# Patient Record
Sex: MALE | Race: OTHER | HISPANIC OR LATINO | URBAN - METROPOLITAN AREA
[De-identification: names, ages, dates, MRNs, and addresses within clinical notes are randomized per-mention and may not be internally consistent; named-entity substitution may affect disease eponyms.]

---

## 2020-04-19 ENCOUNTER — INPATIENT (INPATIENT)
Facility: HOSPITAL | Age: 55
LOS: 3 days | Discharge: HOME | DRG: 177 | End: 2020-04-23
Attending: INTERNAL MEDICINE | Admitting: INTERNAL MEDICINE
Payer: COMMERCIAL

## 2020-04-19 VITALS
WEIGHT: 190.04 LBS | DIASTOLIC BLOOD PRESSURE: 85 MMHG | RESPIRATION RATE: 23 BRPM | OXYGEN SATURATION: 93 % | HEART RATE: 107 BPM | SYSTOLIC BLOOD PRESSURE: 130 MMHG | TEMPERATURE: 101 F

## 2020-04-19 DIAGNOSIS — R68.89 OTHER GENERAL SYMPTOMS AND SIGNS: ICD-10-CM

## 2020-04-19 DIAGNOSIS — J18.9 PNEUMONIA, UNSPECIFIED ORGANISM: ICD-10-CM

## 2020-04-19 DIAGNOSIS — Z29.9 ENCOUNTER FOR PROPHYLACTIC MEASURES, UNSPECIFIED: ICD-10-CM

## 2020-04-19 LAB
24R-OH-CALCIDIOL SERPL-MCNC: 15.8 NG/ML — LOW (ref 30–80)
ALBUMIN SERPL ELPH-MCNC: 2.3 G/DL — LOW (ref 3.5–5)
ALBUMIN SERPL ELPH-MCNC: 2.5 G/DL — LOW (ref 3.5–5)
ALP SERPL-CCNC: 75 U/L — SIGNIFICANT CHANGE UP (ref 40–120)
ALP SERPL-CCNC: 81 U/L — SIGNIFICANT CHANGE UP (ref 40–120)
ALT FLD-CCNC: 108 U/L DA — HIGH (ref 10–60)
ALT FLD-CCNC: 92 U/L DA — HIGH (ref 10–60)
ANION GAP SERPL CALC-SCNC: 8 MMOL/L — SIGNIFICANT CHANGE UP (ref 5–17)
ANION GAP SERPL CALC-SCNC: 9 MMOL/L — SIGNIFICANT CHANGE UP (ref 5–17)
AST SERPL-CCNC: 60 U/L — HIGH (ref 10–40)
AST SERPL-CCNC: 81 U/L — HIGH (ref 10–40)
BASE EXCESS BLDV CALC-SCNC: 4.2 MMOL/L — HIGH (ref -2–2)
BASOPHILS # BLD AUTO: 0.01 K/UL — SIGNIFICANT CHANGE UP (ref 0–0.2)
BASOPHILS NFR BLD AUTO: 0.1 % — SIGNIFICANT CHANGE UP (ref 0–2)
BILIRUB SERPL-MCNC: 0.6 MG/DL — SIGNIFICANT CHANGE UP (ref 0.2–1.2)
BILIRUB SERPL-MCNC: 0.7 MG/DL — SIGNIFICANT CHANGE UP (ref 0.2–1.2)
BLOOD GAS COMMENTS, VENOUS: SIGNIFICANT CHANGE UP
BUN SERPL-MCNC: 15 MG/DL — SIGNIFICANT CHANGE UP (ref 7–18)
BUN SERPL-MCNC: 9 MG/DL — SIGNIFICANT CHANGE UP (ref 7–18)
CALCIUM SERPL-MCNC: 7.4 MG/DL — LOW (ref 8.4–10.5)
CALCIUM SERPL-MCNC: 7.9 MG/DL — LOW (ref 8.4–10.5)
CHLORIDE SERPL-SCNC: 100 MMOL/L — SIGNIFICANT CHANGE UP (ref 96–108)
CHLORIDE SERPL-SCNC: 98 MMOL/L — SIGNIFICANT CHANGE UP (ref 96–108)
CHOLEST SERPL-MCNC: 105 MG/DL — SIGNIFICANT CHANGE UP (ref 10–199)
CO2 SERPL-SCNC: 27 MMOL/L — SIGNIFICANT CHANGE UP (ref 22–31)
CO2 SERPL-SCNC: 28 MMOL/L — SIGNIFICANT CHANGE UP (ref 22–31)
CREAT SERPL-MCNC: 0.89 MG/DL — SIGNIFICANT CHANGE UP (ref 0.5–1.3)
CREAT SERPL-MCNC: 1.1 MG/DL — SIGNIFICANT CHANGE UP (ref 0.5–1.3)
CRP SERPL-MCNC: 10.09 MG/DL — HIGH (ref 0–0.4)
CRP SERPL-MCNC: 8.12 MG/DL — HIGH (ref 0–0.4)
D DIMER BLD IA.RAPID-MCNC: 420 NG/ML DDU — HIGH
D DIMER BLD IA.RAPID-MCNC: 524 NG/ML DDU — HIGH
EOSINOPHIL # BLD AUTO: 0.01 K/UL — SIGNIFICANT CHANGE UP (ref 0–0.5)
EOSINOPHIL NFR BLD AUTO: 0.1 % — SIGNIFICANT CHANGE UP (ref 0–6)
ERYTHROCYTE [SEDIMENTATION RATE] IN BLOOD: 57 MM/HR — HIGH (ref 0–20)
FERRITIN SERPL-MCNC: 1019 NG/ML — HIGH (ref 30–400)
FERRITIN SERPL-MCNC: 1065 NG/ML — HIGH (ref 30–400)
FERRITIN SERPL-MCNC: 993 NG/ML — HIGH (ref 30–400)
FIBRINOGEN PPP-MCNC: 880 MG/DL — HIGH (ref 350–510)
GLUCOSE SERPL-MCNC: 122 MG/DL — HIGH (ref 70–99)
GLUCOSE SERPL-MCNC: 125 MG/DL — HIGH (ref 70–99)
HCO3 BLDV-SCNC: 28 MMOL/L — SIGNIFICANT CHANGE UP (ref 21–29)
HCT VFR BLD CALC: 40.2 % — SIGNIFICANT CHANGE UP (ref 39–50)
HCT VFR BLD CALC: 40.8 % — SIGNIFICANT CHANGE UP (ref 39–50)
HDLC SERPL-MCNC: 18 MG/DL — LOW
HGB BLD-MCNC: 13.7 G/DL — SIGNIFICANT CHANGE UP (ref 13–17)
HGB BLD-MCNC: 13.8 G/DL — SIGNIFICANT CHANGE UP (ref 13–17)
HOROWITZ INDEX BLDV+IHG-RTO: 21 — SIGNIFICANT CHANGE UP
IMM GRANULOCYTES NFR BLD AUTO: 0.6 % — SIGNIFICANT CHANGE UP (ref 0–1.5)
LACTATE SERPL-SCNC: 2.2 MMOL/L — HIGH (ref 0.7–2)
LDH SERPL L TO P-CCNC: 520 U/L — HIGH (ref 120–225)
LDH SERPL L TO P-CCNC: 571 U/L — HIGH (ref 120–225)
LIPID PNL WITH DIRECT LDL SERPL: 61 MG/DL — SIGNIFICANT CHANGE UP
LYMPHOCYTES # BLD AUTO: 1.3 K/UL — SIGNIFICANT CHANGE UP (ref 1–3.3)
LYMPHOCYTES # BLD AUTO: 12.4 % — LOW (ref 13–44)
MAGNESIUM SERPL-MCNC: 3 MG/DL — HIGH (ref 1.6–2.6)
MCHC RBC-ENTMCNC: 29 PG — SIGNIFICANT CHANGE UP (ref 27–34)
MCHC RBC-ENTMCNC: 29.6 PG — SIGNIFICANT CHANGE UP (ref 27–34)
MCHC RBC-ENTMCNC: 33.6 GM/DL — SIGNIFICANT CHANGE UP (ref 32–36)
MCHC RBC-ENTMCNC: 34.3 GM/DL — SIGNIFICANT CHANGE UP (ref 32–36)
MCV RBC AUTO: 86.1 FL — SIGNIFICANT CHANGE UP (ref 80–100)
MCV RBC AUTO: 86.4 FL — SIGNIFICANT CHANGE UP (ref 80–100)
MONOCYTES # BLD AUTO: 0.4 K/UL — SIGNIFICANT CHANGE UP (ref 0–0.9)
MONOCYTES NFR BLD AUTO: 3.8 % — SIGNIFICANT CHANGE UP (ref 2–14)
NEUTROPHILS # BLD AUTO: 8.74 K/UL — HIGH (ref 1.8–7.4)
NEUTROPHILS NFR BLD AUTO: 83 % — HIGH (ref 43–77)
NRBC # BLD: 0 /100 WBCS — SIGNIFICANT CHANGE UP (ref 0–0)
NRBC # BLD: 0 /100 WBCS — SIGNIFICANT CHANGE UP (ref 0–0)
NT-PROBNP SERPL-SCNC: 189 PG/ML — HIGH (ref 0–125)
PCO2 BLDV: 39 MMHG — SIGNIFICANT CHANGE UP (ref 35–50)
PH BLDV: 7.46 — HIGH (ref 7.35–7.45)
PHOSPHATE SERPL-MCNC: 4.2 MG/DL — SIGNIFICANT CHANGE UP (ref 2.5–4.5)
PLATELET # BLD AUTO: 277 K/UL — SIGNIFICANT CHANGE UP (ref 150–400)
PLATELET # BLD AUTO: 296 K/UL — SIGNIFICANT CHANGE UP (ref 150–400)
PO2 BLDV: 25 MMHG — SIGNIFICANT CHANGE UP (ref 25–45)
POTASSIUM SERPL-MCNC: 3 MMOL/L — LOW (ref 3.5–5.3)
POTASSIUM SERPL-MCNC: 3.1 MMOL/L — LOW (ref 3.5–5.3)
POTASSIUM SERPL-SCNC: 3 MMOL/L — LOW (ref 3.5–5.3)
POTASSIUM SERPL-SCNC: 3.1 MMOL/L — LOW (ref 3.5–5.3)
PROCALCITONIN SERPL-MCNC: 0.3 NG/ML — HIGH (ref 0.02–0.1)
PROT SERPL-MCNC: 6.9 G/DL — SIGNIFICANT CHANGE UP (ref 6–8.3)
PROT SERPL-MCNC: 7.3 G/DL — SIGNIFICANT CHANGE UP (ref 6–8.3)
RBC # BLD: 4.67 M/UL — SIGNIFICANT CHANGE UP (ref 4.2–5.8)
RBC # BLD: 4.72 M/UL — SIGNIFICANT CHANGE UP (ref 4.2–5.8)
RBC # FLD: 12.8 % — SIGNIFICANT CHANGE UP (ref 10.3–14.5)
RBC # FLD: 13 % — SIGNIFICANT CHANGE UP (ref 10.3–14.5)
SAO2 % BLDV: 48 % — LOW (ref 67–88)
SARS-COV-2 RNA SPEC QL NAA+PROBE: DETECTED
SODIUM SERPL-SCNC: 134 MMOL/L — LOW (ref 135–145)
SODIUM SERPL-SCNC: 136 MMOL/L — SIGNIFICANT CHANGE UP (ref 135–145)
TOTAL CHOLESTEROL/HDL RATIO MEASUREMENT: 5.8 RATIO — SIGNIFICANT CHANGE UP (ref 3.4–9.6)
TRIGL SERPL-MCNC: 129 MG/DL — SIGNIFICANT CHANGE UP (ref 10–149)
TROPONIN I SERPL-MCNC: <0.015 NG/ML — SIGNIFICANT CHANGE UP (ref 0–0.04)
TSH SERPL-MCNC: 1.37 UU/ML — SIGNIFICANT CHANGE UP (ref 0.34–4.82)
WBC # BLD: 10.52 K/UL — HIGH (ref 3.8–10.5)
WBC # BLD: 7.9 K/UL — SIGNIFICANT CHANGE UP (ref 3.8–10.5)
WBC # FLD AUTO: 10.52 K/UL — HIGH (ref 3.8–10.5)
WBC # FLD AUTO: 7.9 K/UL — SIGNIFICANT CHANGE UP (ref 3.8–10.5)

## 2020-04-19 PROCEDURE — 71045 X-RAY EXAM CHEST 1 VIEW: CPT | Mod: 26

## 2020-04-19 PROCEDURE — 99284 EMERGENCY DEPT VISIT MOD MDM: CPT

## 2020-04-19 RX ORDER — POTASSIUM CHLORIDE 20 MEQ
40 PACKET (EA) ORAL EVERY 4 HOURS
Refills: 0 | Status: COMPLETED | OUTPATIENT
Start: 2020-04-19 | End: 2020-04-19

## 2020-04-19 RX ORDER — ENOXAPARIN SODIUM 100 MG/ML
40 INJECTION SUBCUTANEOUS DAILY
Refills: 0 | Status: DISCONTINUED | OUTPATIENT
Start: 2020-04-19 | End: 2020-04-23

## 2020-04-19 RX ORDER — IBUPROFEN 200 MG
600 TABLET ORAL ONCE
Refills: 0 | Status: COMPLETED | OUTPATIENT
Start: 2020-04-19 | End: 2020-04-19

## 2020-04-19 RX ORDER — HYDROXYCHLOROQUINE SULFATE 200 MG
TABLET ORAL
Refills: 0 | Status: COMPLETED | OUTPATIENT
Start: 2020-04-19 | End: 2020-04-23

## 2020-04-19 RX ORDER — TOCILIZUMAB 20 MG/ML
400 INJECTION, SOLUTION, CONCENTRATE INTRAVENOUS ONCE
Refills: 0 | Status: DISCONTINUED | OUTPATIENT
Start: 2020-04-19 | End: 2020-04-19

## 2020-04-19 RX ORDER — ACETAMINOPHEN 500 MG
650 TABLET ORAL EVERY 6 HOURS
Refills: 0 | Status: DISCONTINUED | OUTPATIENT
Start: 2020-04-19 | End: 2020-04-23

## 2020-04-19 RX ORDER — ERGOCALCIFEROL 1.25 MG/1
50000 CAPSULE ORAL DAILY
Refills: 0 | Status: COMPLETED | OUTPATIENT
Start: 2020-04-19 | End: 2020-04-23

## 2020-04-19 RX ORDER — HYDROXYCHLOROQUINE SULFATE 200 MG
800 TABLET ORAL EVERY 24 HOURS
Refills: 0 | Status: COMPLETED | OUTPATIENT
Start: 2020-04-19 | End: 2020-04-19

## 2020-04-19 RX ORDER — ACETAMINOPHEN 500 MG
650 TABLET ORAL ONCE
Refills: 0 | Status: COMPLETED | OUTPATIENT
Start: 2020-04-19 | End: 2020-04-19

## 2020-04-19 RX ORDER — HYDROXYCHLOROQUINE SULFATE 200 MG
400 TABLET ORAL EVERY 24 HOURS
Refills: 0 | Status: COMPLETED | OUTPATIENT
Start: 2020-04-20 | End: 2020-04-23

## 2020-04-19 RX ADMIN — ENOXAPARIN SODIUM 40 MILLIGRAM(S): 100 INJECTION SUBCUTANEOUS at 12:48

## 2020-04-19 RX ADMIN — Medication 650 MILLIGRAM(S): at 03:29

## 2020-04-19 RX ADMIN — Medication 40 MILLIEQUIVALENT(S): at 12:48

## 2020-04-19 RX ADMIN — Medication 650 MILLIGRAM(S): at 21:51

## 2020-04-19 RX ADMIN — Medication 40 MILLIGRAM(S): at 18:19

## 2020-04-19 RX ADMIN — ERGOCALCIFEROL 50000 UNIT(S): 1.25 CAPSULE ORAL at 12:48

## 2020-04-19 RX ADMIN — Medication 800 MILLIGRAM(S): at 12:48

## 2020-04-19 RX ADMIN — Medication 40 MILLIEQUIVALENT(S): at 15:22

## 2020-04-19 RX ADMIN — Medication 600 MILLIGRAM(S): at 03:29

## 2020-04-19 NOTE — ED PROVIDER NOTE - CLINICAL SUMMARY MEDICAL DECISION MAKING FREE TEXT BOX
55 year old male with covid symptoms. febrile and tachycardic. PE as above.  cxr with b/l infiltrates. labs grossly unremarkable. ecg sinus tachy. likely covid. will admit for O2 requirement.

## 2020-04-19 NOTE — H&P ADULT - PROBLEM SELECTOR PLAN 2
IMPROVE VTE Individual Risk Assessment  RISK                                                                Points  [  ] Previous VTE                                                  3  [  ] Thrombophilia                                               2  [  ] Lower limb paralysis                                      2        (unable to hold up >15 seconds)    [  ] Current Cancer                                              2         (within 6 months)  [x  ] Immobilization > 24 hrs                                1  [  ] ICU/CCU stay > 24 hours                              1  [  ] Age > 60                                                      1  IMPROVE VTE Score 1, lovenox for DVT proph

## 2020-04-19 NOTE — ED PROVIDER NOTE - OBJECTIVE STATEMENT
55 year old male denies PMH coming in with fever, cough, sob for the past week. no sick contacts. no recent travel. denies all other complaints.

## 2020-04-19 NOTE — CONSULT NOTE ADULT - SUBJECTIVE AND OBJECTIVE BOX
PULMONARY CONSULT NOTE      STEFFANY VILLAREAL  MRN-[id number]    Patient is a 55y old  Male who presents with a chief complaint of     · HPI Objective Statement: 55 year old male denies PMH coming in with fever, cough, sob for the past week. no sick contacts. no recent travel. denies all other complaints.	    HISTORY OF PRESENT ILLNESS:As above ,awake, alert lying in bed in NAD.    MEDICATIONS  (STANDING):  enoxaparin Injectable 40 milliGRAM(s) SubCutaneous daily  potassium chloride    Tablet ER 40 milliEquivalent(s) Oral every 4 hours      MEDICATIONS  (PRN):  acetaminophen   Tablet .. 650 milliGRAM(s) Oral every 6 hours PRN Temp greater or equal to 38C (100.4F)  guaiFENesin   Syrup  (Sugar-Free) 100 milliGRAM(s) Oral every 6 hours PRN Cough      Allergies    No Known Allergies    Intolerances        PAST MEDICAL & SURGICAL HISTORY:  No pertinent past medical history      FAMILY HISTORY:      SOCIAL HISTORY  Smoking History:     REVIEW OF SYSTEMS:    CONSTITUTIONAL:  No fevers, chills, sweats    HEENT:  Eyes:  No diplopia or blurred vision. ENT:  No earache, sore throat or runny nose.    CARDIOVASCULAR:  No pressure, squeezing, tightness, or heaviness about the chest; no palpitations.    RESPIRATORY:  Per HPI    GASTROINTESTINAL:  No abdominal pain, nausea, vomiting or diarrhea.    GENITOURINARY:  No dysuria, frequency or urgency.    NEUROLOGIC:  No paresthesias, fasciculations, seizures or weakness.    PSYCHIATRIC:  No disorder of thought or mood.    Vital Signs Last 24 Hrs  T(C): 36.2 (19 Apr 2020 08:53), Max: 38.2 (19 Apr 2020 02:35)  T(F): 97.2 (19 Apr 2020 08:53), Max: 100.8 (19 Apr 2020 02:35)  HR: 63 (19 Apr 2020 08:53) (63 - 107)  BP: 120/78 (19 Apr 2020 08:53) (115/69 - 130/85)  BP(mean): --  RR: 17 (19 Apr 2020 08:53) (17 - 23)  SpO2: 98% (19 Apr 2020 09:53) (93% - 98%)  I&O's Detail      PHYSICAL EXAMINATION:    GENERAL: The patient is a well-developed, well-nourished _____in no apparent distress.     HEENT: Head is normocephalic and atraumatic. Extraocular muscles are intact. Mucous membranes are moist.     NECK: Supple.     LUNGS: Clear to auscultation without wheezing, rales, or rhonchi. Respirations unlabored    HEART: Regular rate and rhythm without murmur.    ABDOMEN: Soft, nontender, and nondistended.  No hepatosplenomegaly is noted.    EXTREMITIES: Without any cyanosis, clubbing, rash, lesions or edema.    NEUROLOGIC: Grossly intact.      LABS:                        13.8   10.52 )-----------( 296      ( 19 Apr 2020 03:04 )             40.2     04-19    134<L>  |  98  |  9   ----------------------------<  122<H>  3.0<L>   |  28  |  0.89    Ca    7.4<L>      19 Apr 2020 03:04    TPro  7.3  /  Alb  2.5<L>  /  TBili  0.7  /  DBili  x   /  AST  81<H>  /  ALT  108<H>  /  AlkPhos  81  04-19          CARDIAC MARKERS ( 19 Apr 2020 03:04 )  <0.015 ng/mL / x     / x     / x     / x          D-Dimer Assay, Quantitative: 524 ng/mL DDU (04-19-20 @ 03:04)    Serum Pro-Brain Natriuretic Peptide: 189 pg/mL (04-19-20 @ 03:04)    Lactate, Blood: 2.2 mmol/L (04-19-20 @ 03:04)        MICROBIOLOGY:    RADIOLOGY & ADDITIONAL STUDIES:    CXR:  < from: Xray Chest 1 View-PORTABLE IMMEDIATE (04.19.20 @ 03:42) >  Impression:    Right lung dense patchy opacities suspicious for pneumonia.      < end of copied text >    Ct scan chest:    ekg;    echo: PULMONARY CONSULT NOTE      STEFFANY VILLAREAL  MRN-[id number]    Patient is a 55y old  Male who presents with a chief complaint of     · HPI Objective Statement: 55 year old male denies PMH coming in with fever, cough, sob for the past week. no sick contacts. no recent travel. denies all other complaints.	    HISTORY OF PRESENT ILLNESS:As above. Awake, alert,  lying in bed in NAD.    MEDICATIONS  (STANDING):  enoxaparin Injectable 40 milliGRAM(s) SubCutaneous daily  potassium chloride    Tablet ER 40 milliEquivalent(s) Oral every 4 hours      MEDICATIONS  (PRN):  acetaminophen   Tablet .. 650 milliGRAM(s) Oral every 6 hours PRN Temp greater or equal to 38C (100.4F)  guaiFENesin   Syrup  (Sugar-Free) 100 milliGRAM(s) Oral every 6 hours PRN Cough      Allergies    No Known Allergies    Intolerances        PAST MEDICAL & SURGICAL HISTORY:  No pertinent past medical history      FAMILY HISTORY:      SOCIAL HISTORY  Smoking History:     REVIEW OF SYSTEMS:    CONSTITUTIONAL:  No fevers, chills, sweats    HEENT:  Eyes:  No diplopia or blurred vision. ENT:  No earache, sore throat or runny nose.    CARDIOVASCULAR:  No pressure, squeezing, tightness, or heaviness about the chest; no palpitations.    RESPIRATORY:  Per HPI    GASTROINTESTINAL:  No abdominal pain, nausea, vomiting or diarrhea.    GENITOURINARY:  No dysuria, frequency or urgency.    NEUROLOGIC:  No paresthesias, fasciculations, seizures or weakness.    PSYCHIATRIC:  No disorder of thought or mood.    Vital Signs Last 24 Hrs  T(C): 36.2 (19 Apr 2020 08:53), Max: 38.2 (19 Apr 2020 02:35)  T(F): 97.2 (19 Apr 2020 08:53), Max: 100.8 (19 Apr 2020 02:35)  HR: 63 (19 Apr 2020 08:53) (63 - 107)  BP: 120/78 (19 Apr 2020 08:53) (115/69 - 130/85)  BP(mean): --  RR: 17 (19 Apr 2020 08:53) (17 - 23)  SpO2: 98% (19 Apr 2020 09:53) (93% - 98%)  I&O's Detail      PHYSICAL EXAMINATION:    GENERAL: The patient is a well-developed, well-nourished _____in no apparent distress.     HEENT: Head is normocephalic and atraumatic. Extraocular muscles are intact. Mucous membranes are moist.     NECK: Supple.     LUNGS: Clear to auscultation without wheezing, rales, or rhonchi. Respirations unlabored    HEART: Regular rate and rhythm without murmur.    ABDOMEN: Soft, nontender, and nondistended.  No hepatosplenomegaly is noted.    EXTREMITIES: Without any cyanosis, clubbing, rash, lesions or edema.    NEUROLOGIC: Grossly intact.      LABS:                        13.8   10.52 )-----------( 296      ( 19 Apr 2020 03:04 )             40.2     04-19    134<L>  |  98  |  9   ----------------------------<  122<H>  3.0<L>   |  28  |  0.89    Ca    7.4<L>      19 Apr 2020 03:04    TPro  7.3  /  Alb  2.5<L>  /  TBili  0.7  /  DBili  x   /  AST  81<H>  /  ALT  108<H>  /  AlkPhos  81  04-19          CARDIAC MARKERS ( 19 Apr 2020 03:04 )  <0.015 ng/mL / x     / x     / x     / x          D-Dimer Assay, Quantitative: 524 ng/mL DDU (04-19-20 @ 03:04)    Serum Pro-Brain Natriuretic Peptide: 189 pg/mL (04-19-20 @ 03:04)    Lactate, Blood: 2.2 mmol/L (04-19-20 @ 03:04)    COVID-19 PCR . (04.19.20 @ 03:04)    COVID-19 PCR: Detected: This test has been validated by [employer] to be accurate;  though it has not been FDA cleared/approved by the usual pathway  As with all laboratory test, results should be correlated with clinical  findings.  https://www.fda.gov/media/642683/download  https://www.fda.gov/media/651807/download        MICROBIOLOGY:    RADIOLOGY & ADDITIONAL STUDIES:    CXR:  < from: Xray Chest 1 View-PORTABLE IMMEDIATE (04.19.20 @ 03:42) >  Impression:    Right lung dense patchy opacities suspicious for pneumonia.      < end of copied text >    Ct scan chest:    ekg;    echo:

## 2020-04-19 NOTE — ED ADULT TRIAGE NOTE - ARRIVAL INFO ADDITIONAL COMMENTS
Fever, chills, cough, bodyaches x1 week.  Pt states he believes he had the COVID, but never got tested for it. Symptoms have continued for a week now.

## 2020-04-19 NOTE — ED ADULT NURSE NOTE - NSIMPLEMENTINTERV_GEN_ALL_ED
Implemented All Universal Safety Interventions:  Jansen to call system. Call bell, personal items and telephone within reach. Instruct patient to call for assistance. Room bathroom lighting operational. Non-slip footwear when patient is off stretcher. Physically safe environment: no spills, clutter or unnecessary equipment. Stretcher in lowest position, wheels locked, appropriate side rails in place.

## 2020-04-19 NOTE — H&P ADULT - PROBLEM SELECTOR PLAN 1
p/w Shortness of breath, cough   - Respiratory status- Not in distress, S02 on NC on my evaluation  - T- 100.8  - WBC: 10.52, transaminitis  - CXR - Right lung dense patchy opacities suspicious for pneumonia.  - Will hold off antibiotics for now  - Tylenol, Robitussin PRN  - Will rule out covid 19 with contact and airborne isolation precaution   - FU CRP, ferritin, procalcitonin  - FU COVID p/w Shortness of breath, cough   - Respiratory status- Not in distress, S02 95% on 5lt.NC on my evaluation  - T- 100.8  - WBC: 10.52, transaminitis  - CXR - Right lung dense patchy opacities suspicious for pneumonia.  - Will hold off antibiotics for now  - Tylenol, Robitussin PRN  - plaquenil  - Will rule out covid 19 with contact and airborne isolation precaution   - elevated ferritin, will give toci and steroids (QTc 436)  - FU COVID

## 2020-04-19 NOTE — H&P ADULT - NSHPPHYSICALEXAM_GEN_ALL_CORE
Vital Signs Last 24 Hrs  T(C): 36.7 (19 Apr 2020 05:00), Max: 38.2 (19 Apr 2020 02:35)  T(F): 98.1 (19 Apr 2020 05:00), Max: 100.8 (19 Apr 2020 02:35)  HR: 81 (19 Apr 2020 05:00) (81 - 107)  BP: 118/73 (19 Apr 2020 05:00) (115/69 - 130/85)  BP(mean): --  RR: 18 (19 Apr 2020 05:00) (18 - 23)  SpO2: 94% (19 Apr 2020 05:00) (93% - 96%)    PHYSICAL EXAM:  GENERAL: NAD  HEENT: Normocephalic;  conjunctivae and sclerae clear; moist mucous membranes;   NECK : supple  CHEST/LUNG: Clear to auscultation bilaterally with good air entry   HEART: S1 S2  regular; no murmurs, gallops or rubs  ABDOMEN: Soft, Nontender, Nondistended; Bowel sounds present  EXTREMITIES: no cyanosis; no edema; no calf tenderness  SKIN: warm and dry; no rash  NERVOUS SYSTEM:  Awake and alert; Oriented  to place, person and time ; no new deficits

## 2020-04-19 NOTE — H&P ADULT - ATTENDING COMMENTS
55 year old male denies PMH coming in with fever, cough, sob for the past week. no sick contacts. no recent travel. denies all other complaints    adm pt seen in bed, vitals stable except for tmx 100.8, physical exam reveals lungs basilar crackles, heart s1s2, abd soft nd nt bs+, ext no edema. labs and diagnostic test result reviewed    assessment  --  acute hypoxic resp fail 2nd to pneumonia 2nd covid      plan  --  admit to med, plaquenil x5 days, vit c, thiamine, contact and airborne isolation, cont albuterol inhaler, cont supportive care with tylenol prn, robitussin prn and O2 via nasal canula as needed    f/u procalcitonin, legionella Ag, strep, mycoplasma Ag, F/u aptt, inr, D-dimer, esr, crp, ldh, ferritin, lactate, t cell subset, cbc, bmp, mg, phos, lipids, tsh, bld cx, ua, ucx      pulm cons 55 year old male denies PMH coming in with fever, cough, sob for the past week. no sick contacts. no recent travel. denies all other complaints    adm pt seen in bed, vitals stable except for tmx 100.8, physical exam reveals lungs basilar crackles, heart s1s2, abd soft nd nt bs+, ext no edema. labs and diagnostic test result reviewed    assessment  --  acute hypoxic resp fail 2nd to pneumonia 2nd covid      plan  --  admit to med, plaquenil x5 days, vit c, thiamine, contact and airborne isolation, cont albuterol inhaler, cont supportive care with tylenol prn, robitussin prn and O2 via nasal canula as needed, supplement potassium for hypokalemia as needed    f/u procalcitonin, legionella Ag, strep, mycoplasma Ag, F/u aptt, inr, D-dimer, esr, crp, ldh, ferritin, lactate, t cell subset, cbc, bmp, mg, phos, lipids, tsh, bld cx, ua, ucx      pulm cons

## 2020-04-19 NOTE — CONSULT NOTE ADULT - PROBLEM SELECTOR RECOMMENDATION 9
Likely secondary to COVID-19 infection   Isolation precautions   COVID-19 PCR   Panculture   Antibiotics  Oxygen supp  Plaquenil PO / Anakinra SQ   F/u CXR  Supportive care   Monitor SpO2 and temp   LDH , ferritin , LFTs, procalcitonin and CRP  Vitamin C ,D , B12 and Zinc replacement   ID consult Likely secondary to COVID-19 infection   Isolation precautions   COVID-19 PCR  positive  Panculture   Antibiotics  Oxygen supp  Plaquenil PO    F/u CXR  Supportive care   Monitor SpO2 and temp   LDH , ferritin , LFTs, procalcitonin and CRP  Vitamin C ,D , B12 and Zinc replacement   ID consult

## 2020-04-20 DIAGNOSIS — B97.21 SARS-ASSOCIATED CORONAVIRUS AS THE CAUSE OF DISEASES CLASSIFIED ELSEWHERE: ICD-10-CM

## 2020-04-20 DIAGNOSIS — Z29.9 ENCOUNTER FOR PROPHYLACTIC MEASURES, UNSPECIFIED: ICD-10-CM

## 2020-04-20 LAB
24R-OH-CALCIDIOL SERPL-MCNC: 15.9 NG/ML — LOW (ref 30–80)
A1C WITH ESTIMATED AVERAGE GLUCOSE RESULT: 6.6 % — HIGH (ref 4–5.6)
ALBUMIN SERPL ELPH-MCNC: 2.4 G/DL — LOW (ref 3.5–5)
ALP SERPL-CCNC: 88 U/L — SIGNIFICANT CHANGE UP (ref 40–120)
ALT FLD-CCNC: 89 U/L DA — HIGH (ref 10–60)
ANION GAP SERPL CALC-SCNC: 7 MMOL/L — SIGNIFICANT CHANGE UP (ref 5–17)
AST SERPL-CCNC: 52 U/L — HIGH (ref 10–40)
BASOPHILS # BLD AUTO: 0.01 K/UL — SIGNIFICANT CHANGE UP (ref 0–0.2)
BASOPHILS NFR BLD AUTO: 0.2 % — SIGNIFICANT CHANGE UP (ref 0–2)
BILIRUB SERPL-MCNC: 0.6 MG/DL — SIGNIFICANT CHANGE UP (ref 0.2–1.2)
BUN SERPL-MCNC: 14 MG/DL — SIGNIFICANT CHANGE UP (ref 7–18)
CALCIUM SERPL-MCNC: 8.4 MG/DL — SIGNIFICANT CHANGE UP (ref 8.4–10.5)
CHLORIDE SERPL-SCNC: 103 MMOL/L — SIGNIFICANT CHANGE UP (ref 96–108)
CHOLEST SERPL-MCNC: 128 MG/DL — SIGNIFICANT CHANGE UP (ref 10–199)
CO2 SERPL-SCNC: 28 MMOL/L — SIGNIFICANT CHANGE UP (ref 22–31)
CREAT SERPL-MCNC: 0.72 MG/DL — SIGNIFICANT CHANGE UP (ref 0.5–1.3)
CRP SERPL-MCNC: 6.73 MG/DL — HIGH (ref 0–0.4)
EOSINOPHIL # BLD AUTO: 0 K/UL — SIGNIFICANT CHANGE UP (ref 0–0.5)
EOSINOPHIL NFR BLD AUTO: 0 % — SIGNIFICANT CHANGE UP (ref 0–6)
ESTIMATED AVERAGE GLUCOSE: 143 MG/DL — HIGH (ref 68–114)
FERRITIN SERPL-MCNC: 1011 NG/ML — HIGH (ref 30–400)
GLUCOSE SERPL-MCNC: 129 MG/DL — HIGH (ref 70–99)
HCT VFR BLD CALC: 43.4 % — SIGNIFICANT CHANGE UP (ref 39–50)
HCV AB S/CO SERPL IA: 0.17 S/CO — SIGNIFICANT CHANGE UP (ref 0–0.99)
HCV AB SERPL-IMP: SIGNIFICANT CHANGE UP
HDLC SERPL-MCNC: 20 MG/DL — LOW
HGB BLD-MCNC: 14.7 G/DL — SIGNIFICANT CHANGE UP (ref 13–17)
IMM GRANULOCYTES NFR BLD AUTO: 0.6 % — SIGNIFICANT CHANGE UP (ref 0–1.5)
LDH SERPL L TO P-CCNC: 505 U/L — HIGH (ref 120–225)
LIPID PNL WITH DIRECT LDL SERPL: 77 MG/DL — SIGNIFICANT CHANGE UP
LYMPHOCYTES # BLD AUTO: 1.05 K/UL — SIGNIFICANT CHANGE UP (ref 1–3.3)
LYMPHOCYTES # BLD AUTO: 16.2 % — SIGNIFICANT CHANGE UP (ref 13–44)
MAGNESIUM SERPL-MCNC: 2.6 MG/DL — SIGNIFICANT CHANGE UP (ref 1.6–2.6)
MCHC RBC-ENTMCNC: 29.6 PG — SIGNIFICANT CHANGE UP (ref 27–34)
MCHC RBC-ENTMCNC: 33.9 GM/DL — SIGNIFICANT CHANGE UP (ref 32–36)
MCV RBC AUTO: 87.3 FL — SIGNIFICANT CHANGE UP (ref 80–100)
MONOCYTES # BLD AUTO: 0.18 K/UL — SIGNIFICANT CHANGE UP (ref 0–0.9)
MONOCYTES NFR BLD AUTO: 2.8 % — SIGNIFICANT CHANGE UP (ref 2–14)
NEUTROPHILS # BLD AUTO: 5.2 K/UL — SIGNIFICANT CHANGE UP (ref 1.8–7.4)
NEUTROPHILS NFR BLD AUTO: 80.2 % — HIGH (ref 43–77)
NRBC # BLD: 0 /100 WBCS — SIGNIFICANT CHANGE UP (ref 0–0)
PHOSPHATE SERPL-MCNC: 3.3 MG/DL — SIGNIFICANT CHANGE UP (ref 2.5–4.5)
PLATELET # BLD AUTO: 307 K/UL — SIGNIFICANT CHANGE UP (ref 150–400)
POTASSIUM SERPL-MCNC: 3.7 MMOL/L — SIGNIFICANT CHANGE UP (ref 3.5–5.3)
POTASSIUM SERPL-SCNC: 3.7 MMOL/L — SIGNIFICANT CHANGE UP (ref 3.5–5.3)
PROT SERPL-MCNC: 7.5 G/DL — SIGNIFICANT CHANGE UP (ref 6–8.3)
RBC # BLD: 4.97 M/UL — SIGNIFICANT CHANGE UP (ref 4.2–5.8)
RBC # FLD: 12.9 % — SIGNIFICANT CHANGE UP (ref 10.3–14.5)
SODIUM SERPL-SCNC: 138 MMOL/L — SIGNIFICANT CHANGE UP (ref 135–145)
TOTAL CHOLESTEROL/HDL RATIO MEASUREMENT: 6.4 RATIO — SIGNIFICANT CHANGE UP (ref 3.4–9.6)
TRIGL SERPL-MCNC: 154 MG/DL — HIGH (ref 10–149)
TSH SERPL-MCNC: 1.88 UU/ML — SIGNIFICANT CHANGE UP (ref 0.34–4.82)
VIT B12 SERPL-MCNC: 643 PG/ML — SIGNIFICANT CHANGE UP (ref 232–1245)
WBC # BLD: 6.48 K/UL — SIGNIFICANT CHANGE UP (ref 3.8–10.5)
WBC # FLD AUTO: 6.48 K/UL — SIGNIFICANT CHANGE UP (ref 3.8–10.5)

## 2020-04-20 RX ADMIN — Medication 400 MILLIGRAM(S): at 11:26

## 2020-04-20 RX ADMIN — Medication 40 MILLIGRAM(S): at 05:57

## 2020-04-20 RX ADMIN — ERGOCALCIFEROL 50000 UNIT(S): 1.25 CAPSULE ORAL at 11:26

## 2020-04-20 RX ADMIN — ENOXAPARIN SODIUM 40 MILLIGRAM(S): 100 INJECTION SUBCUTANEOUS at 11:26

## 2020-04-20 RX ADMIN — Medication 40 MILLIGRAM(S): at 17:11

## 2020-04-20 NOTE — PROGRESS NOTE ADULT - SUBJECTIVE AND OBJECTIVE BOX
Pt is awake, alert, lying in bed in NAD. On O2NC. Feels well today. Denies cough/SOB. Saturating 94%.     INTERVAL HPI/OVERNIGHT EVENTS:      VITAL SIGNS:  T(F): 97.2 (04-20-20 @ 04:51)  HR: 65 (04-20-20 @ 04:51)  BP: 104/64 (04-20-20 @ 04:51)  RR: 16 (04-20-20 @ 04:51)  SpO2: 94% (04-20-20 @ 04:51)  Wt(kg): --  I&O's Detail          REVIEW OF SYSTEMS:    CONSTITUTIONAL:  No fevers, chills, sweats    HEENT:  Eyes:  No diplopia or blurred vision. ENT:  No earache, sore throat or runny nose.    CARDIOVASCULAR:  No pressure, squeezing, tightness, or heaviness about the chest; no palpitations.    RESPIRATORY:  Per HPI    GASTROINTESTINAL:  No abdominal pain, nausea, vomiting or diarrhea.    GENITOURINARY:  No dysuria, frequency or urgency.    NEUROLOGIC:  No paresthesias, fasciculations, seizures or weakness.    PSYCHIATRIC:  No disorder of thought or mood.      PHYSICAL EXAM:    Constitutional: Well developed and nourished  Eyes:Perrla  ENMT: normal  Neck:supple  Respiratory: good air entry  Cardiovascular: S1 S2 regular  Gastrointestinal: Soft, Non tender  Extremities: No edema  Vascular:normal  Neurological:Awake, alert,Ox3  Musculoskeletal:Normal      MEDICATIONS  (STANDING):  enoxaparin Injectable 40 milliGRAM(s) SubCutaneous daily  ergocalciferol 53636 Unit(s) Oral daily  hydroxychloroquine   Oral   hydroxychloroquine 400 milliGRAM(s) Oral every 24 hours  methylPREDNISolone sodium succinate Injectable 40 milliGRAM(s) IV Push every 12 hours    MEDICATIONS  (PRN):  acetaminophen   Tablet .. 650 milliGRAM(s) Oral every 6 hours PRN Temp greater or equal to 38C (100.4F)  guaiFENesin   Syrup  (Sugar-Free) 100 milliGRAM(s) Oral every 6 hours PRN Cough      Allergies    No Known Allergies    Intolerances        LABS:                        14.7   6.48  )-----------( 307      ( 20 Apr 2020 08:36 )             43.4     04-20    138  |  103  |  14  ----------------------------<  129<H>  3.7   |  28  |  0.72    Ca    8.4      20 Apr 2020 08:36  Phos  3.3     04-20  Mg     2.6     04-20    TPro  7.5  /  Alb  2.4<L>  /  TBili  0.6  /  DBili  x   /  AST  52<H>  /  ALT  89<H>  /  AlkPhos  88  04-20          CARDIAC MARKERS ( 19 Apr 2020 03:04 )  <0.015 ng/mL / x     / x     / x     / x          CAPILLARY BLOOD GLUCOSE        pro-bnp -- 04-19 @ 10:51     d-dimer 420  04-19 @ 10:51  pro-bnp 189 04-19 @ 03:04     d-dimer 524  04-19 @ 03:04      RADIOLOGY & ADDITIONAL TESTS:    CXR:    Ct scan chest:    ekg;    echo:

## 2020-04-20 NOTE — PROGRESS NOTE ADULT - SUBJECTIVE AND OBJECTIVE BOX
NP Note discussed with  Primary Attending    Patient is a 55y old  Male who presents with a chief complaint of shortness of breath (20 Apr 2020 06:20)      INTERVAL HPI/OVERNIGHT EVENTS: no new complaints    MEDICATIONS  (STANDING):  enoxaparin Injectable 40 milliGRAM(s) SubCutaneous daily  ergocalciferol 92152 Unit(s) Oral daily  hydroxychloroquine   Oral   hydroxychloroquine 400 milliGRAM(s) Oral every 24 hours  methylPREDNISolone sodium succinate Injectable 40 milliGRAM(s) IV Push every 12 hours    MEDICATIONS  (PRN):  acetaminophen   Tablet .. 650 milliGRAM(s) Oral every 6 hours PRN Temp greater or equal to 38C (100.4F)  guaiFENesin   Syrup  (Sugar-Free) 100 milliGRAM(s) Oral every 6 hours PRN Cough      __________________________________________________  REVIEW OF SYSTEMS:    CONSTITUTIONAL: No fever,   EYES: no acute visual disturbances  NECK: No pain or stiffness  RESPIRATORY: +Mild cough  CARDIOVASCULAR: No chest pain, no palpitations  GASTROINTESTINAL: No pain. No nausea or vomiting; No diarrhea   NEUROLOGICAL: No headache or numbness, no tremors  MUSCULOSKELETAL: No joint pain, no muscle pain  GENITOURINARY: no dysuria, no frequency, no hesitancy  PSYCHIATRY: no depression , no anxiety  ALL OTHER  ROS negative        Vital Signs Last 24 Hrs  T(C): 36.2 (20 Apr 2020 04:51), Max: 38.1 (19 Apr 2020 20:19)  T(F): 97.2 (20 Apr 2020 04:51), Max: 100.6 (19 Apr 2020 20:19)  HR: 65 (20 Apr 2020 04:51) (63 - 91)  BP: 104/64 (20 Apr 2020 04:51) (104/64 - 120/78)  BP(mean): --  RR: 16 (20 Apr 2020 04:51) (16 - 18)  SpO2: 94% (20 Apr 2020 04:51) (94% - 99%)    ________________________________________________  PHYSICAL EXAM:  GENERAL: NAD  HEENT: Normocephalic;  conjunctivae and sclerae clear; moist mucous membranes;   NECK : supple  CHEST/LUNG: Diminished breath sounds  HEART: S1 S2  regular; no murmurs, gallops or rubs  ABDOMEN: Soft, Nontender, Nondistended; Bowel sounds present  EXTREMITIES: no cyanosis; no edema; no calf tenderness  SKIN: warm and dry; no rash  NERVOUS SYSTEM:  Awake and alert; Oriented  to place, person and time ; no new deficits    _________________________________________________  LABS:                        14.7   6.48  )-----------( 307      ( 20 Apr 2020 08:36 )             43.4     04-19    136  |  100  |  15  ----------------------------<  125<H>  3.1<L>   |  27  |  1.10    Ca    7.9<L>      19 Apr 2020 10:51  Phos  4.2     04-19  Mg     3.0     04-19    TPro  6.9  /  Alb  2.3<L>  /  TBili  0.6  /  DBili  x   /  AST  60<H>  /  ALT  92<H>  /  AlkPhos  75  04-19        CAPILLARY BLOOD GLUCOSE            RADIOLOGY & ADDITIONAL TESTS:    Imaging Personally Reviewed:  YES  < from: Xray Chest 1 View-PORTABLE IMMEDIATE (04.19.20 @ 03:42) >  A single frontal view the chest demonstrates patchy alveolar opacities in the right lung laterally suspicious for pneumonia.    Impression:    Right lung dense patchy opacities suspicious for pneumonia.    < end of copied text >      Consultant(s) Notes Reviewed:   YES    Care Discussed with Consultants :     Plan of care was discussed with patient and /or primary care giver; all questions and concerns were addressed and care was aligned with patient's wishes.

## 2020-04-20 NOTE — PROGRESS NOTE ADULT - SUBJECTIVE AND OBJECTIVE BOX
Patient is a 55y old  Male who presents with a chief complaint of R/o Covid-19 (19 Apr 2020 10:17)    pt seen in icu [  ], reg med floor [   ], bed [  ], chair at bedside [   ], a+o x3 [  ], lethargic [  ],  nad [  ]    cullen [  ], ngt [  ], peg [  ], et tube [  ], cent line [  ], picc line [  ]        Allergies    No Known Allergies        Vitals    T(F): 97.2 (04-20-20 @ 04:51), Max: 100.6 (04-19-20 @ 20:19)  HR: 65 (04-20-20 @ 04:51) (63 - 91)  BP: 104/64 (04-20-20 @ 04:51) (104/64 - 120/78)  RR: 916 (04-20-20 @ 04:51) (16 - 916)  SpO2: 94% (04-20-20 @ 04:51) (94% - 99%)  Wt(kg): --  CAPILLARY BLOOD GLUCOSE          Labs                          13.7   7.90  )-----------( 277      ( 19 Apr 2020 10:51 )             40.8       04-19    136  |  100  |  15  ----------------------------<  125<H>  3.1<L>   |  27  |  1.10    Ca    7.9<L>      19 Apr 2020 10:51  Phos  4.2     04-19  Mg     3.0     04-19    TPro  6.9  /  Alb  2.3<L>  /  TBili  0.6  /  DBili  x   /  AST  60<H>  /  ALT  92<H>  /  AlkPhos  75  04-19      CARDIAC MARKERS ( 19 Apr 2020 03:04 )  <0.015 ng/mL / x     / x     / x     / x                Radiology Results      Meds    MEDICATIONS  (STANDING):  enoxaparin Injectable 40 milliGRAM(s) SubCutaneous daily  ergocalciferol 32970 Unit(s) Oral daily  hydroxychloroquine   Oral   hydroxychloroquine 400 milliGRAM(s) Oral every 24 hours  methylPREDNISolone sodium succinate Injectable 40 milliGRAM(s) IV Push every 12 hours      MEDICATIONS  (PRN):  acetaminophen   Tablet .. 650 milliGRAM(s) Oral every 6 hours PRN Temp greater or equal to 38C (100.4F)  guaiFENesin   Syrup  (Sugar-Free) 100 milliGRAM(s) Oral every 6 hours PRN Cough      Physical Exam    Neuro :  no focal deficits  Respiratory: CTA B/L  CV: RRR, S1S2, no murmurs,   Abdominal: Soft, NT, ND +BS,  Extremities: No edema, + peripheral pulses    ASSESSMENT    Other general symptom or sign  No pertinent past medical history      PLAN Patient is a 55y old  Male who presents with a chief complaint of R/o Covid-19 (19 Apr 2020 10:17)    pt seen in icu [  ], reg med floor [ x  ], bed [ x ], chair at bedside [   ], a+o x3 [ x ], lethargic [  ],  nad [x  ]        Allergies    No Known Allergies        Vitals    T(F): 97.2 (04-20-20 @ 04:51), Max: 100.6 (04-19-20 @ 20:19)  HR: 65 (04-20-20 @ 04:51) (63 - 91)  BP: 104/64 (04-20-20 @ 04:51) (104/64 - 120/78)  RR: 916 (04-20-20 @ 04:51) (16 - 916)  SpO2: 94% (04-20-20 @ 04:51) (94% - 99%)  Wt(kg): --  CAPILLARY BLOOD GLUCOSE          Labs                          13.7   7.90  )-----------( 277      ( 19 Apr 2020 10:51 )             40.8       04-19    136  |  100  |  15  ----------------------------<  125<H>  3.1<L>   |  27  |  1.10    Ca    7.9<L>      19 Apr 2020 10:51  Phos  4.2     04-19  Mg     3.0     04-19    TPro  6.9  /  Alb  2.3<L>  /  TBili  0.6  /  DBili  x   /  AST  60<H>  /  ALT  92<H>  /  AlkPhos  75  04-19      CARDIAC MARKERS ( 19 Apr 2020 03:04 )  <0.015 ng/mL / x     / x     / x     / x        D-Dimer Assay, Quantitative: 420 ng/mL DDU (04.19.20 @ 10:51)    Ferritin, Serum: 993 ng/mL (04.19.20 @ 14:01)    C-Reactive Protein, Serum: 8.12 mg/dL (04.19.20 @ 14:01)            Radiology Results      Meds    MEDICATIONS  (STANDING):  enoxaparin Injectable 40 milliGRAM(s) SubCutaneous daily  ergocalciferol 13811 Unit(s) Oral daily  hydroxychloroquine   Oral   hydroxychloroquine 400 milliGRAM(s) Oral every 24 hours  methylPREDNISolone sodium succinate Injectable 40 milliGRAM(s) IV Push every 12 hours      MEDICATIONS  (PRN):  acetaminophen   Tablet .. 650 milliGRAM(s) Oral every 6 hours PRN Temp greater or equal to 38C (100.4F)  guaiFENesin   Syrup  (Sugar-Free) 100 milliGRAM(s) Oral every 6 hours PRN Cough      Physical Exam    Neuro :  no focal deficits  Respiratory: CTA B/L  CV: RRR, S1S2, no murmurs,   Abdominal: Soft, NT, ND +BS,  Extremities: No edema, + peripheral pulses    ASSESSMENT    acute hypoxic resp fail 2nd to pneumonia 2nd covid - 19  hypokalemia    PLAN    pulm f/u  cont albuterol inhaler  covid test positive  afebrile   tmx 100.6   O2 sat 4L n/c 94% ( range 94% - 99%)   cont O2 via n/c and taper as tolerated  contact and airborne isolation  crp, ferritin, d-dimer noted above  cont supportive care with tylenol prn, robitussin prn   dvt prophylaxis  supplement potassium for hypokalemia as needed  cont current meds Patient is a 55y old  Male who presents with a chief complaint of R/o Covid-19 (19 Apr 2020 10:17)    pt seen in icu [  ], reg med floor [ x  ], bed [ x ], chair at bedside [   ], a+o x3 [ x ], lethargic [  ],  nad [x  ]        Allergies    No Known Allergies        Vitals    T(F): 97.2 (04-20-20 @ 04:51), Max: 100.6 (04-19-20 @ 20:19)  HR: 65 (04-20-20 @ 04:51) (63 - 91)  BP: 104/64 (04-20-20 @ 04:51) (104/64 - 120/78)  RR: 916 (04-20-20 @ 04:51) (16 - 916)  SpO2: 94% (04-20-20 @ 04:51) (94% - 99%)  Wt(kg): --  CAPILLARY BLOOD GLUCOSE          Labs                          13.7   7.90  )-----------( 277      ( 19 Apr 2020 10:51 )             40.8       04-19    136  |  100  |  15  ----------------------------<  125<H>  3.1<L>   |  27  |  1.10    Ca    7.9<L>      19 Apr 2020 10:51  Phos  4.2     04-19  Mg     3.0     04-19    TPro  6.9  /  Alb  2.3<L>  /  TBili  0.6  /  DBili  x   /  AST  60<H>  /  ALT  92<H>  /  AlkPhos  75  04-19      CARDIAC MARKERS ( 19 Apr 2020 03:04 )  <0.015 ng/mL / x     / x     / x     / x        D-Dimer Assay, Quantitative: 420 ng/mL DDU (04.19.20 @ 10:51)    Ferritin, Serum: 993 ng/mL (04.19.20 @ 14:01)    C-Reactive Protein, Serum: 8.12 mg/dL (04.19.20 @ 14:01)            Radiology Results      Meds    MEDICATIONS  (STANDING):  enoxaparin Injectable 40 milliGRAM(s) SubCutaneous daily  ergocalciferol 64879 Unit(s) Oral daily  hydroxychloroquine   Oral   hydroxychloroquine 400 milliGRAM(s) Oral every 24 hours  methylPREDNISolone sodium succinate Injectable 40 milliGRAM(s) IV Push every 12 hours      MEDICATIONS  (PRN):  acetaminophen   Tablet .. 650 milliGRAM(s) Oral every 6 hours PRN Temp greater or equal to 38C (100.4F)  guaiFENesin   Syrup  (Sugar-Free) 100 milliGRAM(s) Oral every 6 hours PRN Cough      Physical Exam    Neuro :  no focal deficits  Respiratory: CTA B/L  CV: RRR, S1S2, no murmurs,   Abdominal: Soft, NT, ND +BS,  Extremities: No edema, + peripheral pulses    ASSESSMENT    acute hypoxic resp fail 2nd to pneumonia 2nd covid - 19  hypokalemia    PLAN    pulm f/u  cont albuterol inhaler  covid test positive   cont plaquenil day 2/5  cont ergocalciferol  afebrile   tmx 100.6   O2 sat 4L n/c 94% ( range 94% - 99%)   cont O2 via n/c and taper as tolerated  contact and airborne isolation  crp, ferritin, d-dimer noted above  cont supportive care with tylenol prn, robitussin prn   dvt prophylaxis  supplement potassium for hypokalemia as needed  cont current meds

## 2020-04-20 NOTE — PROGRESS NOTE ADULT - ASSESSMENT
Problem/Recommendation - 1:  Problem: Pneumonia. Recommendation: Likely secondary to COVID-19 infection   Isolation precautions   COVID-19 PCR  positive  Oxygen supp  Plaquenil PO    F/u CXR  Supportive care   Monitor SpO2 and temp   LDH , ferritin , LFTs, procalcitonin and CRP  Vitamin C ,D , B12 and Zinc replacement   Problem/Recommendation - 2:  ·  Problem: Suspected 2019 novel coronavirus infection.  Recommendation: As above.     Problem/Recommendation - 3:  ·  Problem: Hypokalemia  Recommendation: Resolved.   Monitor labs.

## 2020-04-21 LAB
24R-OH-CALCIDIOL SERPL-MCNC: 18.4 NG/ML — LOW (ref 30–80)
A-TUMOR NECROSIS FACT SERPL-MCNC: <5 PG/ML — SIGNIFICANT CHANGE UP
ALBUMIN SERPL ELPH-MCNC: 2.2 G/DL — LOW (ref 3.5–5)
ALP SERPL-CCNC: 75 U/L — SIGNIFICANT CHANGE UP (ref 40–120)
ALT FLD-CCNC: 78 U/L DA — HIGH (ref 10–60)
ANION GAP SERPL CALC-SCNC: 8 MMOL/L — SIGNIFICANT CHANGE UP (ref 5–17)
AST SERPL-CCNC: 49 U/L — HIGH (ref 10–40)
BILIRUB SERPL-MCNC: 0.6 MG/DL — SIGNIFICANT CHANGE UP (ref 0.2–1.2)
BUN SERPL-MCNC: 20 MG/DL — HIGH (ref 7–18)
CALCIUM SERPL-MCNC: 8.4 MG/DL — SIGNIFICANT CHANGE UP (ref 8.4–10.5)
CHLORIDE SERPL-SCNC: 103 MMOL/L — SIGNIFICANT CHANGE UP (ref 96–108)
CO2 SERPL-SCNC: 28 MMOL/L — SIGNIFICANT CHANGE UP (ref 22–31)
CREAT SERPL-MCNC: 0.71 MG/DL — SIGNIFICANT CHANGE UP (ref 0.5–1.3)
CRP SERPL-MCNC: 2.97 MG/DL — HIGH (ref 0–0.4)
FERRITIN SERPL-MCNC: 864 NG/ML — HIGH (ref 30–400)
GLUCOSE SERPL-MCNC: 135 MG/DL — HIGH (ref 70–99)
HCT VFR BLD CALC: 38.1 % — LOW (ref 39–50)
HGB BLD-MCNC: 13 G/DL — SIGNIFICANT CHANGE UP (ref 13–17)
IL10 SERPL-MCNC: 7 PG/ML — SIGNIFICANT CHANGE UP
IL12 SERPL-MCNC: <5 PG/ML — SIGNIFICANT CHANGE UP
IL13 SERPL-MCNC: <5 PG/ML — SIGNIFICANT CHANGE UP
IL2 SERPL-MCNC: 950 PG/ML — SIGNIFICANT CHANGE UP
IL2 SERPL-MCNC: <5 PG/ML — SIGNIFICANT CHANGE UP
IL4 SERPL-MCNC: <5 PG/ML — SIGNIFICANT CHANGE UP
IL6 SERPL-MCNC: 5 PG/ML — SIGNIFICANT CHANGE UP
IL8 SERPL-MCNC: <5 PG/ML — SIGNIFICANT CHANGE UP
INTERFERON GAMMA: <5 PG/ML — SIGNIFICANT CHANGE UP
INTERLEUKIN 1 BETA: <5 PG/ML — SIGNIFICANT CHANGE UP
INTERLEUKIN 17: <5 PG/ML — SIGNIFICANT CHANGE UP
INTERLEUKIN 5: <5 PG/ML — SIGNIFICANT CHANGE UP
LDH SERPL L TO P-CCNC: 401 U/L — HIGH (ref 120–225)
MCHC RBC-ENTMCNC: 29.1 PG — SIGNIFICANT CHANGE UP (ref 27–34)
MCHC RBC-ENTMCNC: 34.1 GM/DL — SIGNIFICANT CHANGE UP (ref 32–36)
MCV RBC AUTO: 85.4 FL — SIGNIFICANT CHANGE UP (ref 80–100)
NRBC # BLD: 0 /100 WBCS — SIGNIFICANT CHANGE UP (ref 0–0)
PLATELET # BLD AUTO: 363 K/UL — SIGNIFICANT CHANGE UP (ref 150–400)
POTASSIUM SERPL-MCNC: 3.4 MMOL/L — LOW (ref 3.5–5.3)
POTASSIUM SERPL-SCNC: 3.4 MMOL/L — LOW (ref 3.5–5.3)
PROT SERPL-MCNC: 6.6 G/DL — SIGNIFICANT CHANGE UP (ref 6–8.3)
RBC # BLD: 4.46 M/UL — SIGNIFICANT CHANGE UP (ref 4.2–5.8)
RBC # FLD: 12.6 % — SIGNIFICANT CHANGE UP (ref 10.3–14.5)
SODIUM SERPL-SCNC: 139 MMOL/L — SIGNIFICANT CHANGE UP (ref 135–145)
WBC # BLD: 8.91 K/UL — SIGNIFICANT CHANGE UP (ref 3.8–10.5)
WBC # FLD AUTO: 8.91 K/UL — SIGNIFICANT CHANGE UP (ref 3.8–10.5)

## 2020-04-21 PROCEDURE — 71045 X-RAY EXAM CHEST 1 VIEW: CPT | Mod: 26

## 2020-04-21 RX ORDER — MULTIVIT-MIN/FERROUS GLUCONATE 9 MG/15 ML
1 LIQUID (ML) ORAL DAILY
Refills: 0 | Status: DISCONTINUED | OUTPATIENT
Start: 2020-04-21 | End: 2020-04-23

## 2020-04-21 RX ADMIN — ERGOCALCIFEROL 50000 UNIT(S): 1.25 CAPSULE ORAL at 13:12

## 2020-04-21 RX ADMIN — Medication 400 MILLIGRAM(S): at 13:12

## 2020-04-21 RX ADMIN — Medication 40 MILLIGRAM(S): at 17:53

## 2020-04-21 RX ADMIN — Medication 40 MILLIGRAM(S): at 05:44

## 2020-04-21 RX ADMIN — ENOXAPARIN SODIUM 40 MILLIGRAM(S): 100 INJECTION SUBCUTANEOUS at 13:12

## 2020-04-21 NOTE — PROGRESS NOTE ADULT - SUBJECTIVE AND OBJECTIVE BOX
Patient is a 55y old  Male who presents with a chief complaint of shortness of breath (20 Apr 2020 12:01)    pt seen in icu [  ], reg med floor [ x  ], bed [ x ], chair at bedside [   ], a+o x3 [ x ], lethargic [  ],    nad [x  ]      Allergies    No Known Allergies        Vitals    T(F): 97.3 (04-21-20 @ 04:56), Max: 98 (04-20-20 @ 20:08)  HR: 82 (04-21-20 @ 04:56) (74 - 85)  BP: 134/85 (04-21-20 @ 04:56) (124/73 - 135/87)  RR: 18 (04-21-20 @ 04:56) (14 - 18)  SpO2: 96% (04-21-20 @ 04:56) (93% - 96%)  Wt(kg): --  CAPILLARY BLOOD GLUCOSE          Labs                          14.7   6.48  )-----------( 307      ( 20 Apr 2020 08:36 )             43.4       04-20    138  |  103  |  14  ----------------------------<  129<H>  3.7   |  28  |  0.72    Ca    8.4      20 Apr 2020 08:36  Phos  3.3     04-20  Mg     2.6     04-20    TPro  7.5  /  Alb  2.4<L>  /  TBili  0.6  /  DBili  x   /  AST  52<H>  /  ALT  89<H>  /  AlkPhos  88  04-20                Radiology Results      Meds    MEDICATIONS  (STANDING):  enoxaparin Injectable 40 milliGRAM(s) SubCutaneous daily  ergocalciferol 62539 Unit(s) Oral daily  hydroxychloroquine   Oral   hydroxychloroquine 400 milliGRAM(s) Oral every 24 hours  methylPREDNISolone sodium succinate Injectable 40 milliGRAM(s) IV Push every 12 hours      MEDICATIONS  (PRN):  acetaminophen   Tablet .. 650 milliGRAM(s) Oral every 6 hours PRN Temp greater or equal to 38C (100.4F)  guaiFENesin   Syrup  (Sugar-Free) 100 milliGRAM(s) Oral every 6 hours PRN Cough      Physical Exam    Neuro :  no focal deficits  Respiratory: CTA B/L  CV: RRR, S1S2, no murmurs,   Abdominal: Soft, NT, ND +BS,  Extremities: No edema, + peripheral pulses    ASSESSMENT    acute hypoxic resp fail 2nd to pneumonia 2nd covid - 19  hypokalemia    PLAN    pulm f/u  cont albuterol inhaler  covid test positive   cont plaquenil day 2/5  cont ergocalciferol  afebrile   tmx 100.6   O2 sat 4L n/c 94% ( range 94% - 99%)   cont O2 via n/c and taper as tolerated  contact and airborne isolation  crp, ferritin, d-dimer noted above  cont supportive care with tylenol prn, robitussin prn   dvt prophylaxis  supplement potassium for hypokalemia as needed  cont current meds Patient is a 55y old  Male who presents with a chief complaint of shortness of breath (20 Apr 2020 12:01)    pt seen in icu [  ], reg med floor [ x  ], bed [ x ], chair at bedside [   ], a+o x3 [ x ], lethargic [  ],    nad [x  ]    pt states feeling better      Allergies    No Known Allergies        Vitals    T(F): 97.3 (04-21-20 @ 04:56), Max: 98 (04-20-20 @ 20:08)  HR: 82 (04-21-20 @ 04:56) (74 - 85)  BP: 134/85 (04-21-20 @ 04:56) (124/73 - 135/87)  RR: 18 (04-21-20 @ 04:56) (14 - 18)  SpO2: 96% (04-21-20 @ 04:56) (93% - 96%)  Wt(kg): --  CAPILLARY BLOOD GLUCOSE          Labs                          14.7   6.48  )-----------( 307      ( 20 Apr 2020 08:36 )             43.4       04-20    138  |  103  |  14  ----------------------------<  129<H>  3.7   |  28  |  0.72    Ca    8.4      20 Apr 2020 08:36  Phos  3.3     04-20  Mg     2.6     04-20    TPro  7.5  /  Alb  2.4<L>  /  TBili  0.6  /  DBili  x   /  AST  52<H>  /  ALT  89<H>  /  AlkPhos  88  04-20                Radiology Results      Meds    MEDICATIONS  (STANDING):  enoxaparin Injectable 40 milliGRAM(s) SubCutaneous daily  ergocalciferol 75249 Unit(s) Oral daily  hydroxychloroquine   Oral   hydroxychloroquine 400 milliGRAM(s) Oral every 24 hours  methylPREDNISolone sodium succinate Injectable 40 milliGRAM(s) IV Push every 12 hours      MEDICATIONS  (PRN):  acetaminophen   Tablet .. 650 milliGRAM(s) Oral every 6 hours PRN Temp greater or equal to 38C (100.4F)  guaiFENesin   Syrup  (Sugar-Free) 100 milliGRAM(s) Oral every 6 hours PRN Cough      Physical Exam    Neuro :  no focal deficits  Respiratory: CTA B/L  CV: RRR, S1S2, no murmurs,   Abdominal: Soft, NT, ND +BS,  Extremities: No edema, + peripheral pulses    ASSESSMENT    acute hypoxic resp fail 2nd to pneumonia 2nd covid - 19  hypokalemia    PLAN    pulm f/u  cont albuterol inhaler  covid test positive   cont plaquenil day 3/5  cont ergocalciferol  afebrile   tmx 98   O2 sat 4L n/c 96% ( range 93% - 96%)   cont O2 via n/c and taper as tolerated  contact and airborne isolation  crp, ferritin, d-dimer noted above  cont supportive care with tylenol prn, robitussin prn   dvt prophylaxis  supplement potassium for hypokalemia as needed  cont current meds

## 2020-04-21 NOTE — PROGRESS NOTE ADULT - ASSESSMENT
Problem/Recommendation - 1:  Problem: Pneumonia. Recommendation: Likely secondary to COVID-19 infection   Isolation precautions   COVID-19 PCR  positive  Oxygen supp  Continue Plaquenil/Steroids   F/u CXR  Supportive care   Monitor SpO2 and temp   LDH , Ferritin , LFTs, Procalcitonin and CRP  Vitamin C ,D , B12 and Zinc replacement     Problem/Recommendation - 2:  ·  Problem: Suspected 2019 Novel Coronavirus Infection.  Recommendation: As above.     Problem/Recommendation - 3:  ·  Problem: Hypokalemia  Recommendation: Resolved.   Monitor labs.

## 2020-04-21 NOTE — DISCHARGE NOTE PROVIDER - PROVIDER TOKENS
PROVIDER:[TOKEN:[[id number]]] FREE:[LAST:[Arslan],FIRST:[Clifford],PHONE:[phone]],FAX:[(   )    -],ADDRESS:[[address] [place] [address]]],PROVIDER:[TOKEN:[[id number]]]

## 2020-04-21 NOTE — DISCHARGE NOTE PROVIDER - HOSPITAL COURSE
55 year old male with SOB for 1 week before admission.    Labs                                14.7     6.48  )-----------( 307      ( 20 Apr 2020 08:36 )               43.4             04-20        138  |  103  |  14    ----------------------------<  129<H>    3.7   |  28  |  0.72        Ca    8.4      20 Apr 2020 08:36    Phos  3.3     04-20    Mg     2.6     04-20        TPro  7.5  /  Alb  2.4<L>  /  TBili  0.6  /  DBili  x   /  AST  52<H>  /  ALT  89<H>  /  AlkPhos  88  04-20    < from: Xray Chest 1 View- PORTABLE-Routine (04.21.20 @ 15:36) >        The heart is normal in size. The mediastinum and francoise cannot be assessed due to projection.         The pulmonary vasculature is normal.         Mild thoracic degenerative changes are present.        IMPRESSION:        Stable bilateral airspace infiltrates.        < end of copied text >    You have tested POSITIVE for the novel coronavirus (COVID-19). Upon discharge, you must self-quarantine for 14 days, or until the Department of Health contacts you. Please wear a face mask if you are around other individuals. Try to avoid contact with house members, family, and friends for the duration of this quarantine. Please follow up with your primary care physician within 2-3 weeks of your discharge from the hospital. Please take all medications as prescribed. If you experience any worsening or recurrence of your symptoms, particularly worsening or high fever, shortness of breathe, extreme fatigue, or bloody cough please call [phone] immediately or report to the nearest Emergency Department.

## 2020-04-21 NOTE — PROGRESS NOTE ADULT - SUBJECTIVE AND OBJECTIVE BOX
Pt is awake, alert, lying in bed in NAD. O2 NC in place. Saturating 96% on 2 L. Reports feeling well. Not coughing or SOB. Afebrile.     INTERVAL HPI/OVERNIGHT EVENTS:      VITAL SIGNS:  T(F): 97.3 (04-21-20 @ 04:56)  HR: 82 (04-21-20 @ 04:56)  BP: 134/85 (04-21-20 @ 04:56)  RR: 18 (04-21-20 @ 04:56)  SpO2: 96% (04-21-20 @ 04:56)  Wt(kg): --  I&O's Detail          REVIEW OF SYSTEMS:    CONSTITUTIONAL:  No fevers, chills, sweats    HEENT:  Eyes:  No diplopia or blurred vision. ENT:  No earache, sore throat or runny nose.    CARDIOVASCULAR:  No pressure, squeezing, tightness, or heaviness about the chest; no palpitations.    RESPIRATORY:  Per HPI    GASTROINTESTINAL:  No abdominal pain, nausea, vomiting or diarrhea.    GENITOURINARY:  No dysuria, frequency or urgency.    NEUROLOGIC:  No paresthesias, fasciculations, seizures or weakness.    PSYCHIATRIC:  No disorder of thought or mood.      PHYSICAL EXAM:    Constitutional: Well developed and nourished  Eyes:Perrla  ENMT: normal  Neck:supple  Respiratory: good air entry  Cardiovascular: S1 S2 regular  Gastrointestinal: Soft, Non tender  Extremities: No edema  Vascular:normal  Neurological:Awake, alert,Ox3  Musculoskeletal:Normal      MEDICATIONS  (STANDING):  enoxaparin Injectable 40 milliGRAM(s) SubCutaneous daily  ergocalciferol 84103 Unit(s) Oral daily  hydroxychloroquine   Oral   hydroxychloroquine 400 milliGRAM(s) Oral every 24 hours  methylPREDNISolone sodium succinate Injectable 40 milliGRAM(s) IV Push every 12 hours    MEDICATIONS  (PRN):  acetaminophen   Tablet .. 650 milliGRAM(s) Oral every 6 hours PRN Temp greater or equal to 38C (100.4F)  guaiFENesin   Syrup  (Sugar-Free) 100 milliGRAM(s) Oral every 6 hours PRN Cough      Allergies    No Known Allergies    Intolerances        LABS:                        13.0   8.91  )-----------( 363      ( 21 Apr 2020 06:49 )             38.1     04-21    139  |  103  |  20<H>  ----------------------------<  135<H>  3.4<L>   |  28  |  0.71    Ca    8.4      21 Apr 2020 06:49  Phos  3.3     04-20  Mg     2.6     04-20    TPro  6.6  /  Alb  2.2<L>  /  TBili  0.6  /  DBili  x   /  AST  49<H>  /  ALT  78<H>  /  AlkPhos  75  04-21              CAPILLARY BLOOD GLUCOSE        pro-bnp -- 04-19 @ 10:51     d-dimer 420  04-19 @ 10:51  pro-bnp 189 04-19 @ 03:04     d-dimer 524  04-19 @ 03:04      RADIOLOGY & ADDITIONAL TESTS:    CXR:  < from: Xray Chest 1 View-PORTABLE IMMEDIATE (04.19.20 @ 03:42) >  Impression:    Right lung dense patchy opacities suspicious for pneumonia.    < end of copied text >    Ct scan chest:    ekg;    echo:

## 2020-04-21 NOTE — DISCHARGE NOTE PROVIDER - NSDCCPCAREPLAN_GEN_ALL_CORE_FT
PRINCIPAL DISCHARGE DIAGNOSIS  Diagnosis: SARS-associated coronavirus infection  Assessment and Plan of Treatment: You were treated with Plaquenil while in the hospital        SECONDARY DISCHARGE DIAGNOSES  Diagnosis: Acute respiratory failure with hypoxia  Assessment and Plan of Treatment: You were given oxygen while in the hospital. It was determined that you do not require oxygen at home. If you feel short of breath call your primary doctor right away. PRINCIPAL DISCHARGE DIAGNOSIS  Diagnosis: SARS-associated coronavirus infection  Assessment and Plan of Treatment: You were treated with Plaquenil while in the hospital. Please complete medication as ordered.        SECONDARY DISCHARGE DIAGNOSES  Diagnosis: Acute respiratory failure with hypoxia  Assessment and Plan of Treatment: You were given oxygen while in the hospital. It was determined that you do  require oxygen at home. You are to keep oxygen set at 2 LPM. If you feel short of breath call your primary doctor right away. You have been pprescribed an Albuterol inhaler in case you feel short of breath. You can use 2 inhalations every 6 hours as needed. If you need your inhaler every 6 hours please call your doctor.

## 2020-04-21 NOTE — DISCHARGE NOTE PROVIDER - CARE PROVIDER_API CALL
Sheldon Carmona)  Internal Medicine  [address] [place] [address]  Phone: [phone]  Fax: [phone]  Follow Up Time: Clifford Mcdaniels  [address] [place] [address]  Phone: [phone]  Fax: (   )    -  Follow Up Time:     Sheldon Carmona)  Internal Medicine  [address] [place] [address]  Phone: [phone]  Fax: [phone]  Follow Up Time:

## 2020-04-22 DIAGNOSIS — J96.01 ACUTE RESPIRATORY FAILURE WITH HYPOXIA: ICD-10-CM

## 2020-04-22 LAB
ALBUMIN SERPL ELPH-MCNC: 2.3 G/DL — LOW (ref 3.5–5)
ALP SERPL-CCNC: 83 U/L — SIGNIFICANT CHANGE UP (ref 40–120)
ALT FLD-CCNC: 130 U/L DA — HIGH (ref 10–60)
ANION GAP SERPL CALC-SCNC: 7 MMOL/L — SIGNIFICANT CHANGE UP (ref 5–17)
AST SERPL-CCNC: 96 U/L — HIGH (ref 10–40)
BASOPHILS # BLD AUTO: 0.01 K/UL — SIGNIFICANT CHANGE UP (ref 0–0.2)
BASOPHILS NFR BLD AUTO: 0.1 % — SIGNIFICANT CHANGE UP (ref 0–2)
BILIRUB SERPL-MCNC: 0.5 MG/DL — SIGNIFICANT CHANGE UP (ref 0.2–1.2)
BUN SERPL-MCNC: 18 MG/DL — SIGNIFICANT CHANGE UP (ref 7–18)
CALCIUM SERPL-MCNC: 8.3 MG/DL — LOW (ref 8.4–10.5)
CHLORIDE SERPL-SCNC: 106 MMOL/L — SIGNIFICANT CHANGE UP (ref 96–108)
CO2 SERPL-SCNC: 26 MMOL/L — SIGNIFICANT CHANGE UP (ref 22–31)
CREAT SERPL-MCNC: 0.73 MG/DL — SIGNIFICANT CHANGE UP (ref 0.5–1.3)
CRP SERPL-MCNC: 1.17 MG/DL — HIGH (ref 0–0.4)
D DIMER BLD IA.RAPID-MCNC: 277 NG/ML DDU — HIGH
EOSINOPHIL # BLD AUTO: 0 K/UL — SIGNIFICANT CHANGE UP (ref 0–0.5)
EOSINOPHIL NFR BLD AUTO: 0 % — SIGNIFICANT CHANGE UP (ref 0–6)
FERRITIN SERPL-MCNC: 929 NG/ML — HIGH (ref 30–400)
GLUCOSE SERPL-MCNC: 115 MG/DL — HIGH (ref 70–99)
HCT VFR BLD CALC: 37.6 % — LOW (ref 39–50)
HGB BLD-MCNC: 12.7 G/DL — LOW (ref 13–17)
IMM GRANULOCYTES NFR BLD AUTO: 0.7 % — SIGNIFICANT CHANGE UP (ref 0–1.5)
LDH SERPL L TO P-CCNC: 366 U/L — HIGH (ref 120–225)
LYMPHOCYTES # BLD AUTO: 1.37 K/UL — SIGNIFICANT CHANGE UP (ref 1–3.3)
LYMPHOCYTES # BLD AUTO: 15.4 % — SIGNIFICANT CHANGE UP (ref 13–44)
MCHC RBC-ENTMCNC: 29.6 PG — SIGNIFICANT CHANGE UP (ref 27–34)
MCHC RBC-ENTMCNC: 33.8 GM/DL — SIGNIFICANT CHANGE UP (ref 32–36)
MCV RBC AUTO: 87.6 FL — SIGNIFICANT CHANGE UP (ref 80–100)
MONOCYTES # BLD AUTO: 0.58 K/UL — SIGNIFICANT CHANGE UP (ref 0–0.9)
MONOCYTES NFR BLD AUTO: 6.5 % — SIGNIFICANT CHANGE UP (ref 2–14)
NEUTROPHILS # BLD AUTO: 6.87 K/UL — SIGNIFICANT CHANGE UP (ref 1.8–7.4)
NEUTROPHILS NFR BLD AUTO: 77.3 % — HIGH (ref 43–77)
NRBC # BLD: 0 /100 WBCS — SIGNIFICANT CHANGE UP (ref 0–0)
PLATELET # BLD AUTO: 382 K/UL — SIGNIFICANT CHANGE UP (ref 150–400)
POTASSIUM SERPL-MCNC: 3.8 MMOL/L — SIGNIFICANT CHANGE UP (ref 3.5–5.3)
POTASSIUM SERPL-SCNC: 3.8 MMOL/L — SIGNIFICANT CHANGE UP (ref 3.5–5.3)
PROT SERPL-MCNC: 6.4 G/DL — SIGNIFICANT CHANGE UP (ref 6–8.3)
RBC # BLD: 4.29 M/UL — SIGNIFICANT CHANGE UP (ref 4.2–5.8)
RBC # FLD: 12.7 % — SIGNIFICANT CHANGE UP (ref 10.3–14.5)
SODIUM SERPL-SCNC: 139 MMOL/L — SIGNIFICANT CHANGE UP (ref 135–145)
WBC # BLD: 8.89 K/UL — SIGNIFICANT CHANGE UP (ref 3.8–10.5)
WBC # FLD AUTO: 8.89 K/UL — SIGNIFICANT CHANGE UP (ref 3.8–10.5)

## 2020-04-22 PROCEDURE — 71045 X-RAY EXAM CHEST 1 VIEW: CPT | Mod: 26

## 2020-04-22 RX ADMIN — ERGOCALCIFEROL 50000 UNIT(S): 1.25 CAPSULE ORAL at 12:02

## 2020-04-22 RX ADMIN — Medication 40 MILLIGRAM(S): at 05:39

## 2020-04-22 RX ADMIN — Medication 400 MILLIGRAM(S): at 12:02

## 2020-04-22 RX ADMIN — ENOXAPARIN SODIUM 40 MILLIGRAM(S): 100 INJECTION SUBCUTANEOUS at 12:02

## 2020-04-22 RX ADMIN — Medication 1 TABLET(S): at 12:02

## 2020-04-22 NOTE — PROGRESS NOTE ADULT - ASSESSMENT
Problem/Recommendation - 1:  Problem: Pneumonia. Recommendation: Likely secondary to COVID-19 infection   Isolation precautions   COVID-19 PCR  positive  Oxygen supp  Continue Plaquenil/Steroids   F/u CXR shows stable infiltrates 4/21.   Supportive care   Monitor SpO2 and temp   LDH , Ferritin , LFTs, Procalcitonin and CRP  Vitamin C ,D , B12 and Zinc replacement     Problem/Recommendation - 2:  ·  Problem: Suspected 2019 Novel Coronavirus Infection.  Recommendation: As above.     Problem/Recommendation - 3:  ·  Problem: Hypokalemia  Recommendation: Resolved.   Monitor labs. Problem/Recommendation - 1:  Problem: Pneumonia. Recommendation: Likely secondary to COVID-19 infection   Isolation precautions   COVID-19 PCR  positive  Oxygen supp  Continue Plaquenil/Steroids completed  F/u CXR shows stable infiltrates 4/21.   Supportive care   Monitor SpO2 and temp   LDH , Ferritin , LFTs, Procalcitonin and CRP  Vitamin C ,D , B12 and Zinc replacement     Problem/Recommendation - 2:  ·  Problem: Suspected 2019 Novel Coronavirus Infection.  Recommendation: As above.     Problem/Recommendation - 3:  ·  Problem: Hypokalemia  Recommendation: Resolved.   Monitor labs.

## 2020-04-22 NOTE — PROGRESS NOTE ADULT - PROBLEM SELECTOR PLAN 1
Continue plaquenil.  Continue oxygen support. Home oxygen ordered.  Patient desaturated to 74% on RA at rest during the night.

## 2020-04-22 NOTE — PROGRESS NOTE ADULT - SUBJECTIVE AND OBJECTIVE BOX
Patient is a 55y old  Male who presents with a chief complaint of shortness of breath (21 Apr 2020 15:41)      pt seen in icu [  ], reg med floor [ x  ], bed [ x ], chair at bedside [   ], a+o x3 [ x ], lethargic [  ],    nad [x  ]    pt states feeling better      Allergies    No Known Allergies        Vitals    T(F): 97.2 (04-22-20 @ 04:53), Max: 97.8 (04-21-20 @ 13:00)  HR: 64 (04-22-20 @ 04:53) (64 - 81)  BP: 129/79 (04-22-20 @ 04:53) (129/79 - 133/95)  RR: 19 (04-22-20 @ 04:53) (18 - 19)  SpO2: 96% (04-22-20 @ 04:53) (93% - 96%)  Wt(kg): --  CAPILLARY BLOOD GLUCOSE          Labs                          13.0   8.91  )-----------( 363      ( 21 Apr 2020 06:49 )             38.1       04-21    139  |  103  |  20<H>  ----------------------------<  135<H>  3.4<L>   |  28  |  0.71    Ca    8.4      21 Apr 2020 06:49  Phos  3.3     04-20  Mg     2.6     04-20    TPro  6.6  /  Alb  2.2<L>  /  TBili  0.6  /  DBili  x   /  AST  49<H>  /  ALT  78<H>  /  AlkPhos  75  04-21                Radiology Results      Meds    MEDICATIONS  (STANDING):  enoxaparin Injectable 40 milliGRAM(s) SubCutaneous daily  ergocalciferol 24782 Unit(s) Oral daily  hydroxychloroquine   Oral   hydroxychloroquine 400 milliGRAM(s) Oral every 24 hours  multivitamin/minerals 1 Tablet(s) Oral daily      MEDICATIONS  (PRN):  acetaminophen   Tablet .. 650 milliGRAM(s) Oral every 6 hours PRN Temp greater or equal to 38C (100.4F)  guaiFENesin   Syrup  (Sugar-Free) 100 milliGRAM(s) Oral every 6 hours PRN Cough      Physical Exam    Neuro :  no focal deficits  Respiratory: CTA B/L  CV: RRR, S1S2, no murmurs,   Abdominal: Soft, NT, ND +BS,  Extremities: No edema, + peripheral pulses    ASSESSMENT    acute hypoxic resp fail 2nd to pneumonia 2nd covid - 19  hypokalemia    PLAN    pulm f/u  cont albuterol inhaler  covid test positive   cont plaquenil day 3/5  cont ergocalciferol  afebrile   tmx 98   O2 sat 4L n/c 96% ( range 93% - 96%)   cont O2 via n/c and taper as tolerated  contact and airborne isolation  crp, ferritin, d-dimer noted above  cont supportive care with tylenol prn, robitussin prn   dvt prophylaxis  supplement potassium for hypokalemia as needed  cont current meds Patient is a 55y old  Male who presents with a chief complaint of shortness of breath (21 Apr 2020 15:41)      pt seen in icu [  ], reg med floor [ x  ], bed [ x ], chair at bedside [   ], a+o x3 [ x ], lethargic [  ],    nad [x  ]    pt states feeling better      Allergies    No Known Allergies        Vitals    T(F): 97.2 (04-22-20 @ 04:53), Max: 97.8 (04-21-20 @ 13:00)  HR: 64 (04-22-20 @ 04:53) (64 - 81)  BP: 129/79 (04-22-20 @ 04:53) (129/79 - 133/95)  RR: 19 (04-22-20 @ 04:53) (18 - 19)  SpO2: 96% (04-22-20 @ 04:53) (93% - 96%)  Wt(kg): --  CAPILLARY BLOOD GLUCOSE          Labs                          13.0   8.91  )-----------( 363      ( 21 Apr 2020 06:49 )             38.1       04-21    139  |  103  |  20<H>  ----------------------------<  135<H>  3.4<L>   |  28  |  0.71    Ca    8.4      21 Apr 2020 06:49  Phos  3.3     04-20  Mg     2.6     04-20    TPro  6.6  /  Alb  2.2<L>  /  TBili  0.6  /  DBili  x   /  AST  49<H>  /  ALT  78<H>  /  AlkPhos  75  04-21                Radiology Results      Meds    MEDICATIONS  (STANDING):  enoxaparin Injectable 40 milliGRAM(s) SubCutaneous daily  ergocalciferol 83325 Unit(s) Oral daily  hydroxychloroquine   Oral   hydroxychloroquine 400 milliGRAM(s) Oral every 24 hours  multivitamin/minerals 1 Tablet(s) Oral daily      MEDICATIONS  (PRN):  acetaminophen   Tablet .. 650 milliGRAM(s) Oral every 6 hours PRN Temp greater or equal to 38C (100.4F)  guaiFENesin   Syrup  (Sugar-Free) 100 milliGRAM(s) Oral every 6 hours PRN Cough      Physical Exam    Neuro :  no focal deficits  Respiratory: CTA B/L  CV: RRR, S1S2, no murmurs,   Abdominal: Soft, NT, ND +BS,  Extremities: No edema, + peripheral pulses    ASSESSMENT    acute hypoxic resp fail 2nd to pneumonia 2nd covid - 19  hypokalemia    PLAN    pulm f/u  cont albuterol inhaler  covid test positive   cont plaquenil day 4/5  cont ergocalciferol  afebrile   tmx 97.8   O2 sat 4L n/c 96%, 93 on ra  ( range 93% - 96%)   cont O2 via n/c with activity  contact and airborne isolation  crp, ferritin, d-dimer noted above  cont supportive care with tylenol prn, robitussin prn   dvt prophylaxis  supplement potassium for hypokalemia as needed  cont current meds   pt stable for d/c with home O2, pulse oximeter and outpt home visit

## 2020-04-22 NOTE — PROGRESS NOTE ADULT - SUBJECTIVE AND OBJECTIVE BOX
NP Note discussed with  Primary Attending    Patient is a 55y old  Male who presents with a chief complaint of shortness of breath (22 Apr 2020 06:25)      INTERVAL HPI/OVERNIGHT EVENTS: Patient desaturated overnight on RA to 74% 2 LPM nasal cannula started O2 improved to 94%    MEDICATIONS  (STANDING):  enoxaparin Injectable 40 milliGRAM(s) SubCutaneous daily  ergocalciferol 81326 Unit(s) Oral daily  hydroxychloroquine   Oral   hydroxychloroquine 400 milliGRAM(s) Oral every 24 hours  multivitamin/minerals 1 Tablet(s) Oral daily    MEDICATIONS  (PRN):  acetaminophen   Tablet .. 650 milliGRAM(s) Oral every 6 hours PRN Temp greater or equal to 38C (100.4F)  guaiFENesin   Syrup  (Sugar-Free) 100 milliGRAM(s) Oral every 6 hours PRN Cough      __________________________________________________  REVIEW OF SYSTEMS:    CONSTITUTIONAL: No fever,   EYES: no acute visual disturbances  NECK: No pain or stiffness  RESPIRATORY: No cough; Intermittent SOB  CARDIOVASCULAR: No chest pain, no palpitations  GASTROINTESTINAL: No pain. No nausea or vomiting; No diarrhea   NEUROLOGICAL: No headache or numbness, no tremors  MUSCULOSKELETAL: No joint pain, no muscle pain  GENITOURINARY: no dysuria, no frequency, no hesitancy  PSYCHIATRY: no depression , no anxiety  ALL OTHER  ROS negative        Vital Signs Last 24 Hrs  T(C): 36.2 (22 Apr 2020 04:53), Max: 36.6 (21 Apr 2020 13:00)  T(F): 97.2 (22 Apr 2020 04:53), Max: 97.8 (21 Apr 2020 13:00)  HR: 64 (22 Apr 2020 04:53) (64 - 81)  BP: 129/79 (22 Apr 2020 04:53) (129/79 - 133/95)  BP(mean): --  RR: 19 (22 Apr 2020 04:53) (18 - 19)  SpO2: 96% (22 Apr 2020 04:53) (93% - 96%)    ________________________________________________  PHYSICAL EXAM:  GENERAL: NAD  HEENT: Normocephalic;  conjunctivae and sclerae clear; moist mucous membranes;   NECK : supple  CHEST/LUNG: Diminished breath sounds bilateral bases  HEART: S1 S2  regular; no murmurs, gallops or rubs  ABDOMEN: Soft, Nontender, Nondistended; Bowel sounds present  EXTREMITIES: no cyanosis; no edema; no calf tenderness  SKIN: warm and dry; no rash  NERVOUS SYSTEM:  Awake and alert; Oriented  to place, person and time ; no new deficits    _________________________________________________  LABS:                        12.7   8.89  )-----------( 382      ( 22 Apr 2020 07:13 )             37.6     04-22    139  |  106  |  18  ----------------------------<  115<H>  3.8   |  26  |  0.73    Ca    8.3<L>      22 Apr 2020 07:13    TPro  6.4  /  Alb  2.3<L>  /  TBili  0.5  /  DBili  x   /  AST  96<H>  /  ALT  130<H>  /  AlkPhos  83  04-22        CAPILLARY BLOOD GLUCOSE            RADIOLOGY & ADDITIONAL TESTS:    Imaging Personally Reviewed:  YES  < from: Xray Chest 1 View- PORTABLE-Routine (04.21.20 @ 15:36) >  AP view of the chest demonstrates bilateral airspace infiltrates without significant change.. There is no pleural effusion. There is no pneumothorax.    The heart is normal in size. The mediastinum and francoise cannot be assessed due to projection.     The pulmonary vasculature is normal.     Mild thoracic degenerative changes are present.    IMPRESSION:    Stable bilateral airspace infiltrates.      < end of copied text >      Consultant(s) Notes Reviewed:   YES    Care Discussed with Consultants :     Plan of care was discussed with patient and /or primary care giver; all questions and concerns were addressed and care was aligned with patient's wishes.

## 2020-04-22 NOTE — PROGRESS NOTE ADULT - PROBLEM SELECTOR PLAN 3
Your opinion matters! Thank you for choosing Dr. Jailene Gonzales at [facility]. You may receive a survey in the mail about today's visit.  We always appreciate feedback.  It was a pleasure to care for you today!    
DVT and GI prophylaxis.  Discharge planning.  Home oxygen ordered.
DVT and GI prophylaxis.  Will need oxygen testing before discharge.

## 2020-04-22 NOTE — PROGRESS NOTE ADULT - SUBJECTIVE AND OBJECTIVE BOX
Pt is awake, alert, lying in bed in NAD. Saturating 96%. Afebrile.     INTERVAL HPI/OVERNIGHT EVENTS:      VITAL SIGNS:  T(F): 97.2 (04-22-20 @ 04:53)  HR: 64 (04-22-20 @ 04:53)  BP: 129/79 (04-22-20 @ 04:53)  RR: 19 (04-22-20 @ 04:53)  SpO2: 96% (04-22-20 @ 04:53)  Wt(kg): --  I&O's Detail    21 Apr 2020 07:01  -  22 Apr 2020 07:00  --------------------------------------------------------  IN:    Oral Fluid: 75 mL  Total IN: 75 mL    OUT:    Voided: 3501 mL  Total OUT: 3501 mL    Total NET: -3426 mL      REVIEW OF SYSTEMS:    CONSTITUTIONAL:  No fevers, chills, sweats    HEENT:  Eyes:  No diplopia or blurred vision. ENT:  No earache, sore throat or runny nose.    CARDIOVASCULAR:  No pressure, squeezing, tightness, or heaviness about the chest; no palpitations.    RESPIRATORY:  Per HPI    GASTROINTESTINAL:  No abdominal pain, nausea, vomiting or diarrhea.    GENITOURINARY:  No dysuria, frequency or urgency.    NEUROLOGIC:  No paresthesias, fasciculations, seizures or weakness.    PSYCHIATRIC:  No disorder of thought or mood.      PHYSICAL EXAM:    Constitutional: Well developed and nourished  Eyes:Perrla  ENMT: normal  Neck:supple  Respiratory: good air entry  Cardiovascular: S1 S2 regular  Gastrointestinal: Soft, Non tender  Extremities: No edema  Vascular:normal  Neurological:Awake, alert,Ox3  Musculoskeletal:Normal      MEDICATIONS  (STANDING):  enoxaparin Injectable 40 milliGRAM(s) SubCutaneous daily  ergocalciferol 79518 Unit(s) Oral daily  hydroxychloroquine   Oral   hydroxychloroquine 400 milliGRAM(s) Oral every 24 hours  multivitamin/minerals 1 Tablet(s) Oral daily    MEDICATIONS  (PRN):  acetaminophen   Tablet .. 650 milliGRAM(s) Oral every 6 hours PRN Temp greater or equal to 38C (100.4F)  guaiFENesin   Syrup  (Sugar-Free) 100 milliGRAM(s) Oral every 6 hours PRN Cough      Allergies    No Known Allergies    Intolerances        LABS:                        12.7   8.89  )-----------( 382      ( 22 Apr 2020 07:13 )             37.6     04-22    139  |  106  |  18  ----------------------------<  115<H>  3.8   |  26  |  0.73    Ca    8.3<L>      22 Apr 2020 07:13    TPro  6.4  /  Alb  2.3<L>  /  TBili  0.5  /  DBili  x   /  AST  96<H>  /  ALT  130<H>  /  AlkPhos  83  04-22    CAPILLARY BLOOD GLUCOSE        pro-bnp -- 04-22 @ 07:13     d-dimer 277  04-22 @ 07:13  pro-bnp -- 04-19 @ 10:51     d-dimer 420  04-19 @ 10:51  pro-bnp 189 04-19 @ 03:04     d-dimer 524  04-19 @ 03:04      RADIOLOGY & ADDITIONAL TESTS:    CXR:  < from: Xray Chest 1 View- PORTABLE-Routine (04.21.20 @ 15:36) >  IMPRESSION:    Stable bilateral airspace infiltrates.    < end of copied text >    Ct scan chest:    ekg;    echo:

## 2020-04-22 NOTE — PROGRESS NOTE ADULT - ASSESSMENT
55 year old male who presented with SOB for 1 week.  4/22  Desaturated over night on RA to 74%. Will need oxygen upon discharge.

## 2020-04-23 VITALS
RESPIRATION RATE: 19 BRPM | OXYGEN SATURATION: 92 % | TEMPERATURE: 98 F | DIASTOLIC BLOOD PRESSURE: 84 MMHG | SYSTOLIC BLOOD PRESSURE: 119 MMHG | HEART RATE: 86 BPM

## 2020-04-23 LAB
24R-OH-CALCIDIOL SERPL-MCNC: 28 NG/ML — LOW (ref 30–80)
ALBUMIN SERPL ELPH-MCNC: 2.2 G/DL — LOW (ref 3.5–5)
ALP SERPL-CCNC: 87 U/L — SIGNIFICANT CHANGE UP (ref 40–120)
ALT FLD-CCNC: 149 U/L DA — HIGH (ref 10–60)
ANION GAP SERPL CALC-SCNC: 7 MMOL/L — SIGNIFICANT CHANGE UP (ref 5–17)
AST SERPL-CCNC: 72 U/L — HIGH (ref 10–40)
BASOPHILS # BLD AUTO: 0.02 K/UL — SIGNIFICANT CHANGE UP (ref 0–0.2)
BASOPHILS NFR BLD AUTO: 0.2 % — SIGNIFICANT CHANGE UP (ref 0–2)
BILIRUB SERPL-MCNC: 0.5 MG/DL — SIGNIFICANT CHANGE UP (ref 0.2–1.2)
BUN SERPL-MCNC: 18 MG/DL — SIGNIFICANT CHANGE UP (ref 7–18)
CALCIUM SERPL-MCNC: 7.9 MG/DL — LOW (ref 8.4–10.5)
CHLORIDE SERPL-SCNC: 108 MMOL/L — SIGNIFICANT CHANGE UP (ref 96–108)
CO2 SERPL-SCNC: 26 MMOL/L — SIGNIFICANT CHANGE UP (ref 22–31)
CREAT SERPL-MCNC: 0.78 MG/DL — SIGNIFICANT CHANGE UP (ref 0.5–1.3)
CRP SERPL-MCNC: 0.69 MG/DL — HIGH (ref 0–0.4)
EOSINOPHIL # BLD AUTO: 0.09 K/UL — SIGNIFICANT CHANGE UP (ref 0–0.5)
EOSINOPHIL NFR BLD AUTO: 0.8 % — SIGNIFICANT CHANGE UP (ref 0–6)
FERRITIN SERPL-MCNC: 770 NG/ML — HIGH (ref 30–400)
GLUCOSE SERPL-MCNC: 75 MG/DL — SIGNIFICANT CHANGE UP (ref 70–99)
HCT VFR BLD CALC: 38 % — LOW (ref 39–50)
HGB BLD-MCNC: 12.7 G/DL — LOW (ref 13–17)
IMM GRANULOCYTES NFR BLD AUTO: 1.6 % — HIGH (ref 0–1.5)
LDH SERPL L TO P-CCNC: 328 U/L — HIGH (ref 120–225)
LYMPHOCYTES # BLD AUTO: 2.38 K/UL — SIGNIFICANT CHANGE UP (ref 1–3.3)
LYMPHOCYTES # BLD AUTO: 22.3 % — SIGNIFICANT CHANGE UP (ref 13–44)
MCHC RBC-ENTMCNC: 29.6 PG — SIGNIFICANT CHANGE UP (ref 27–34)
MCHC RBC-ENTMCNC: 33.4 GM/DL — SIGNIFICANT CHANGE UP (ref 32–36)
MCV RBC AUTO: 88.6 FL — SIGNIFICANT CHANGE UP (ref 80–100)
MONOCYTES # BLD AUTO: 0.8 K/UL — SIGNIFICANT CHANGE UP (ref 0–0.9)
MONOCYTES NFR BLD AUTO: 7.5 % — SIGNIFICANT CHANGE UP (ref 2–14)
NEUTROPHILS # BLD AUTO: 7.23 K/UL — SIGNIFICANT CHANGE UP (ref 1.8–7.4)
NEUTROPHILS NFR BLD AUTO: 67.6 % — SIGNIFICANT CHANGE UP (ref 43–77)
NRBC # BLD: 0 /100 WBCS — SIGNIFICANT CHANGE UP (ref 0–0)
PLATELET # BLD AUTO: 388 K/UL — SIGNIFICANT CHANGE UP (ref 150–400)
POTASSIUM SERPL-MCNC: 3.4 MMOL/L — LOW (ref 3.5–5.3)
POTASSIUM SERPL-SCNC: 3.4 MMOL/L — LOW (ref 3.5–5.3)
PROT SERPL-MCNC: 6.1 G/DL — SIGNIFICANT CHANGE UP (ref 6–8.3)
RBC # BLD: 4.29 M/UL — SIGNIFICANT CHANGE UP (ref 4.2–5.8)
RBC # FLD: 12.9 % — SIGNIFICANT CHANGE UP (ref 10.3–14.5)
SODIUM SERPL-SCNC: 141 MMOL/L — SIGNIFICANT CHANGE UP (ref 135–145)
WBC # BLD: 10.69 K/UL — HIGH (ref 3.8–10.5)
WBC # FLD AUTO: 10.69 K/UL — HIGH (ref 3.8–10.5)

## 2020-04-23 PROCEDURE — 82306 VITAMIN D 25 HYDROXY: CPT

## 2020-04-23 PROCEDURE — 84443 ASSAY THYROID STIM HORMONE: CPT

## 2020-04-23 PROCEDURE — 82803 BLOOD GASES ANY COMBINATION: CPT

## 2020-04-23 PROCEDURE — 83880 ASSAY OF NATRIURETIC PEPTIDE: CPT

## 2020-04-23 PROCEDURE — 99053 MED SERV 10PM-8AM 24 HR FAC: CPT

## 2020-04-23 PROCEDURE — 86803 HEPATITIS C AB TEST: CPT

## 2020-04-23 PROCEDURE — 82607 VITAMIN B-12: CPT

## 2020-04-23 PROCEDURE — 83036 HEMOGLOBIN GLYCOSYLATED A1C: CPT

## 2020-04-23 PROCEDURE — 85652 RBC SED RATE AUTOMATED: CPT

## 2020-04-23 PROCEDURE — 84100 ASSAY OF PHOSPHORUS: CPT

## 2020-04-23 PROCEDURE — 83735 ASSAY OF MAGNESIUM: CPT

## 2020-04-23 PROCEDURE — 83520 IMMUNOASSAY QUANT NOS NONAB: CPT

## 2020-04-23 PROCEDURE — 87635 SARS-COV-2 COVID-19 AMP PRB: CPT

## 2020-04-23 PROCEDURE — 80061 LIPID PANEL: CPT

## 2020-04-23 PROCEDURE — 84484 ASSAY OF TROPONIN QUANT: CPT

## 2020-04-23 PROCEDURE — 83615 LACTATE (LD) (LDH) ENZYME: CPT

## 2020-04-23 PROCEDURE — 36415 COLL VENOUS BLD VENIPUNCTURE: CPT

## 2020-04-23 PROCEDURE — 85384 FIBRINOGEN ACTIVITY: CPT

## 2020-04-23 PROCEDURE — 85027 COMPLETE CBC AUTOMATED: CPT

## 2020-04-23 PROCEDURE — 93005 ELECTROCARDIOGRAM TRACING: CPT

## 2020-04-23 PROCEDURE — 99285 EMERGENCY DEPT VISIT HI MDM: CPT | Mod: 25

## 2020-04-23 PROCEDURE — 80053 COMPREHEN METABOLIC PANEL: CPT

## 2020-04-23 PROCEDURE — 71045 X-RAY EXAM CHEST 1 VIEW: CPT

## 2020-04-23 PROCEDURE — 82728 ASSAY OF FERRITIN: CPT

## 2020-04-23 PROCEDURE — 85379 FIBRIN DEGRADATION QUANT: CPT

## 2020-04-23 PROCEDURE — 86140 C-REACTIVE PROTEIN: CPT

## 2020-04-23 PROCEDURE — 84145 PROCALCITONIN (PCT): CPT

## 2020-04-23 PROCEDURE — 83605 ASSAY OF LACTIC ACID: CPT

## 2020-04-23 RX ORDER — MONTELUKAST 4 MG/1
1 TABLET, CHEWABLE ORAL
Qty: 5 | Refills: 0
Start: 2020-04-23 | End: 2020-04-27

## 2020-04-23 RX ORDER — ALBUTEROL 90 UG/1
2 AEROSOL, METERED ORAL
Qty: 1 | Refills: 0
Start: 2020-04-23

## 2020-04-23 RX ORDER — POTASSIUM CHLORIDE 20 MEQ
40 PACKET (EA) ORAL ONCE
Refills: 0 | Status: COMPLETED | OUTPATIENT
Start: 2020-04-23 | End: 2020-04-23

## 2020-04-23 RX ADMIN — Medication 1 TABLET(S): at 11:40

## 2020-04-23 RX ADMIN — Medication 40 MILLIEQUIVALENT(S): at 11:41

## 2020-04-23 RX ADMIN — ENOXAPARIN SODIUM 40 MILLIGRAM(S): 100 INJECTION SUBCUTANEOUS at 11:40

## 2020-04-23 RX ADMIN — Medication 400 MILLIGRAM(S): at 11:40

## 2020-04-23 RX ADMIN — ERGOCALCIFEROL 50000 UNIT(S): 1.25 CAPSULE ORAL at 11:40

## 2020-04-23 NOTE — PROGRESS NOTE ADULT - SUBJECTIVE AND OBJECTIVE BOX
Patient is a 55y old  Male who presents with a chief complaint of shortness of breath (22 Apr 2020 11:44)    pt seen in icu [  ], reg med floor [ x  ], bed [ x ], chair at bedside [   ], a+o x3 [ x ], lethargic [  ],    nad [x  ]    pt states feeling better      Allergies    No Known Allergies        Vitals    T(F): 98.1 (04-23-20 @ 05:07), Max: 98.1 (04-23-20 @ 05:07)  HR: 73 (04-23-20 @ 05:07) (72 - 83)  BP: 118/84 (04-23-20 @ 05:07) (116/74 - 127/75)  RR: 18 (04-23-20 @ 05:07) (16 - 18)  SpO2: 96% (04-23-20 @ 05:07) (95% - 98%)  Wt(kg): --  CAPILLARY BLOOD GLUCOSE          Labs                          12.7   8.89  )-----------( 382      ( 22 Apr 2020 07:13 )             37.6       04-22    139  |  106  |  18  ----------------------------<  115<H>  3.8   |  26  |  0.73    Ca    8.3<L>      22 Apr 2020 07:13    TPro  6.4  /  Alb  2.3<L>  /  TBili  0.5  /  DBili  x   /  AST  96<H>  /  ALT  130<H>  /  AlkPhos  83  04-22                Radiology Results      Meds    MEDICATIONS  (STANDING):  enoxaparin Injectable 40 milliGRAM(s) SubCutaneous daily  ergocalciferol 45466 Unit(s) Oral daily  hydroxychloroquine   Oral   hydroxychloroquine 400 milliGRAM(s) Oral every 24 hours  multivitamin/minerals 1 Tablet(s) Oral daily      MEDICATIONS  (PRN):  acetaminophen   Tablet .. 650 milliGRAM(s) Oral every 6 hours PRN Temp greater or equal to 38C (100.4F)  guaiFENesin   Syrup  (Sugar-Free) 100 milliGRAM(s) Oral every 6 hours PRN Cough      Physical Exam      Neuro :  no focal deficits  Respiratory: CTA B/L  CV: RRR, S1S2, no murmurs,   Abdominal: Soft, NT, ND +BS,  Extremities: No edema, + peripheral pulses    ASSESSMENT    acute hypoxic resp fail 2nd to pneumonia 2nd covid - 19  hypokalemia    PLAN    pulm f/u  cont albuterol inhaler  covid test positive   cont plaquenil day 4/5  cont ergocalciferol  afebrile   tmx 97.8   O2 sat 4L n/c 96%, 93 on ra  ( range 93% - 96%)   cont O2 via n/c with activity  contact and airborne isolation  crp, ferritin, d-dimer noted above  cont supportive care with tylenol prn, robitussin prn   dvt prophylaxis  supplement potassium for hypokalemia as needed  cont current meds   pt stable for d/c with home O2, pulse oximeter and outpt home visit Patient is a 55y old  Male who presents with a chief complaint of shortness of breath (22 Apr 2020 11:44)    pt seen in icu [  ], reg med floor [ x  ], bed [ x ], chair at bedside [   ], a+o x3 [ x ], lethargic [  ],    nad [x  ]    pt states feeling better      Allergies    No Known Allergies        Vitals    T(F): 98.1 (04-23-20 @ 05:07), Max: 98.1 (04-23-20 @ 05:07)  HR: 73 (04-23-20 @ 05:07) (72 - 83)  BP: 118/84 (04-23-20 @ 05:07) (116/74 - 127/75)  RR: 18 (04-23-20 @ 05:07) (16 - 18)  SpO2: 96% (04-23-20 @ 05:07) (95% - 98%)  Wt(kg): --  CAPILLARY BLOOD GLUCOSE          Labs                          12.7   8.89  )-----------( 382      ( 22 Apr 2020 07:13 )             37.6       04-22    139  |  106  |  18  ----------------------------<  115<H>  3.8   |  26  |  0.73    Ca    8.3<L>      22 Apr 2020 07:13    TPro  6.4  /  Alb  2.3<L>  /  TBili  0.5  /  DBili  x   /  AST  96<H>  /  ALT  130<H>  /  AlkPhos  83  04-22                Radiology Results      Meds    MEDICATIONS  (STANDING):  enoxaparin Injectable 40 milliGRAM(s) SubCutaneous daily  ergocalciferol 54213 Unit(s) Oral daily  hydroxychloroquine   Oral   hydroxychloroquine 400 milliGRAM(s) Oral every 24 hours  multivitamin/minerals 1 Tablet(s) Oral daily      MEDICATIONS  (PRN):  acetaminophen   Tablet .. 650 milliGRAM(s) Oral every 6 hours PRN Temp greater or equal to 38C (100.4F)  guaiFENesin   Syrup  (Sugar-Free) 100 milliGRAM(s) Oral every 6 hours PRN Cough      Physical Exam      Neuro :  no focal deficits  Respiratory: CTA B/L  CV: RRR, S1S2, no murmurs,   Abdominal: Soft, NT, ND +BS,  Extremities: No edema, + peripheral pulses    ASSESSMENT    acute hypoxic resp fail 2nd to pneumonia 2nd covid - 19  hypokalemia    PLAN    pulm f/u  cont albuterol inhaler  covid test positive   cont plaquenil day 4/5  cont ergocalciferol  afebrile   tmx 98.1  O2 sat ra 95%, ( range 95% - 98%)   cont O2 via n/c with activity  contact and airborne isolation  crp, ferritin, d-dimer noted above  cont supportive care with tylenol prn, robitussin prn   dvt prophylaxis  supplement potassium for hypokalemia as needed  cont current meds   pt stable for d/c with home O2, pulse oximeter and outpt home visit

## 2020-04-23 NOTE — DISCHARGE NOTE NURSING/CASE MANAGEMENT/SOCIAL WORK - PATIENT PORTAL LINK FT
You can access the FollowMyHealth Patient Portal offered by [facility] by registering at the following website: [email or web address]. By joining [facility]’s FollowMyHealth portal, you will also be able to view your health information using other applications (apps) compatible with our system.

## 2020-04-23 NOTE — PROGRESS NOTE ADULT - ASSESSMENT
Problem/Recommendation - 1:  Problem: Pneumonia. Recommendation: Likely secondary to COVID-19 infection   Isolation precautions   COVID-19 PCR  positive  Oxygen supp  Continue Plaquenil/Steroids completed  F/u CXR shows stable infiltrates 4/21.   Supportive care   Monitor SpO2 and temp   LDH , Ferritin , LFTs, Procalcitonin and CRP  Vitamin C ,D , B12 and Zinc replacement     Problem/Recommendation - 2:  ·  Problem: Suspected 2019 Novel Coronavirus Infection.  Recommendation: As above.     Problem/Recommendation - 3:  ·  Problem: Hypokalemia  Recommendation: Resolved.   Monitor labs.

## 2020-04-23 NOTE — PROGRESS NOTE ADULT - SUBJECTIVE AND OBJECTIVE BOX
Pt is awake, alert, lying in bed in NAD. Saturating 96%. Afebrile.     INTERVAL HPI/OVERNIGHT EVENTS:      VITAL SIGNS:  T(F): 98.1 (04-23-20 @ 05:07)  HR: 73 (04-23-20 @ 05:07)  BP: 118/84 (04-23-20 @ 05:07)  RR: 18 (04-23-20 @ 05:07)  SpO2: 96% (04-23-20 @ 05:07)  Wt(kg): --  I&O's Detail          REVIEW OF SYSTEMS:    CONSTITUTIONAL:  No fevers, chills, sweats    HEENT:  Eyes:  No diplopia or blurred vision. ENT:  No earache, sore throat or runny nose.    CARDIOVASCULAR:  No pressure, squeezing, tightness, or heaviness about the chest; no palpitations.    RESPIRATORY:  Per HPI    GASTROINTESTINAL:  No abdominal pain, nausea, vomiting or diarrhea.    GENITOURINARY:  No dysuria, frequency or urgency.    NEUROLOGIC:  No paresthesias, fasciculations, seizures or weakness.    PSYCHIATRIC:  No disorder of thought or mood.      PHYSICAL EXAM:    Constitutional: Well developed and nourished  Eyes:Perrla  ENMT: normal  Neck:supple  Respiratory: good air entry  Cardiovascular: S1 S2 regular  Gastrointestinal: Soft, Non tender  Extremities: No edema  Vascular:normal  Neurological:Awake, alert,Ox3  Musculoskeletal:Normal      MEDICATIONS  (STANDING):  enoxaparin Injectable 40 milliGRAM(s) SubCutaneous daily  ergocalciferol 22618 Unit(s) Oral daily  hydroxychloroquine   Oral   hydroxychloroquine 400 milliGRAM(s) Oral every 24 hours  multivitamin/minerals 1 Tablet(s) Oral daily  potassium chloride    Tablet ER 40 milliEquivalent(s) Oral once    MEDICATIONS  (PRN):  acetaminophen   Tablet .. 650 milliGRAM(s) Oral every 6 hours PRN Temp greater or equal to 38C (100.4F)  guaiFENesin   Syrup  (Sugar-Free) 100 milliGRAM(s) Oral every 6 hours PRN Cough      Allergies    No Known Allergies    Intolerances        LABS:                        12.7   10.69 )-----------( 388      ( 23 Apr 2020 06:48 )             38.0     04-23    141  |  108  |  18  ----------------------------<  75  3.4<L>   |  26  |  0.78    Ca    7.9<L>      23 Apr 2020 06:48    TPro  6.1  /  Alb  2.2<L>  /  TBili  0.5  /  DBili  x   /  AST  72<H>  /  ALT  149<H>  /  AlkPhos  87  04-23              CAPILLARY BLOOD GLUCOSE        pro-bnp -- 04-22 @ 07:13     d-dimer 277  04-22 @ 07:13  pro-bnp -- 04-19 @ 10:51     d-dimer 420  04-19 @ 10:51  pro-bnp 189 04-19 @ 03:04     d-dimer 524  04-19 @ 03:04      RADIOLOGY & ADDITIONAL TESTS:    CXR:    Ct scan chest:    ekg;    echo:

## 2020-04-24 LAB — PROCALCITONIN SERPL-MCNC: 0.09 NG/ML — SIGNIFICANT CHANGE UP (ref 0.02–0.1)

## 2020-06-08 NOTE — H&P ADULT - HISTORY OF PRESENT ILLNESS
Patient called in to return a call. Transferred to nurse for further assistance.   55M from home, presented with fever, cough, shortness of breath. 55M from home, lives with wife and son, no PMH, presented with fever, cough, shortness of breath X 1 week. He reports he took some medication for 6 days with mild relief but he does not know name. He states his wife was sick but was not tested. Denies N/V/D, chest pain, dizziness, travel.
